# Patient Record
Sex: FEMALE | Race: WHITE | Employment: FULL TIME | URBAN - METROPOLITAN AREA
[De-identification: names, ages, dates, MRNs, and addresses within clinical notes are randomized per-mention and may not be internally consistent; named-entity substitution may affect disease eponyms.]

---

## 2019-04-15 ENCOUNTER — HOSPITAL ENCOUNTER (EMERGENCY)
Age: 34
Discharge: HOME | End: 2019-04-15
Payer: MEDICAID

## 2019-04-15 ENCOUNTER — APPOINTMENT (OUTPATIENT)
Dept: GENERAL RADIOLOGY | Age: 34
End: 2019-04-15
Payer: MEDICAID

## 2019-04-15 VITALS
HEART RATE: 84 BPM | SYSTOLIC BLOOD PRESSURE: 121 MMHG | DIASTOLIC BLOOD PRESSURE: 79 MMHG | OXYGEN SATURATION: 99 % | WEIGHT: 150 LBS | TEMPERATURE: 98.5 F | RESPIRATION RATE: 15 BRPM | BODY MASS INDEX: 25.61 KG/M2 | HEIGHT: 64 IN

## 2019-04-15 DIAGNOSIS — R11.0 NAUSEA: ICD-10-CM

## 2019-04-15 DIAGNOSIS — M25.572 ACUTE LEFT ANKLE PAIN: Primary | ICD-10-CM

## 2019-04-15 DIAGNOSIS — S82.892A CLOSED AVULSION FRACTURE OF LEFT ANKLE, INITIAL ENCOUNTER: ICD-10-CM

## 2019-04-15 PROCEDURE — 99283 EMERGENCY DEPT VISIT LOW MDM: CPT

## 2019-04-15 PROCEDURE — 73610 X-RAY EXAM OF ANKLE: CPT

## 2019-04-15 RX ORDER — PROMETHAZINE HYDROCHLORIDE 25 MG/1
25 TABLET ORAL EVERY 6 HOURS PRN
Qty: 20 TABLET | Refills: 0 | Status: SHIPPED | OUTPATIENT
Start: 2019-04-15 | End: 2019-04-22

## 2019-04-15 ASSESSMENT — ENCOUNTER SYMPTOMS
BACK PAIN: 0
NAUSEA: 0
COUGH: 0
VOMITING: 0
DIARRHEA: 0
COLOR CHANGE: 0
ABDOMINAL PAIN: 0

## 2019-04-15 ASSESSMENT — PAIN SCALES - GENERAL: PAINLEVEL_OUTOF10: 7

## 2019-04-15 ASSESSMENT — PAIN DESCRIPTION - PAIN TYPE: TYPE: ACUTE PAIN

## 2019-04-15 ASSESSMENT — PAIN DESCRIPTION - LOCATION: LOCATION: ANKLE

## 2019-04-15 ASSESSMENT — PAIN DESCRIPTION - ORIENTATION: ORIENTATION: LEFT

## 2019-04-15 NOTE — ED NOTES
Left foot pain. Pt states \"I injured my left foot on Saturday morning stepped in a hole and twisted seen at Urgent Care x-ray taken/pain meds given make me sick to my stomach/I called [facility] they said to come here for a referral in order to be seen at [facility] Surgeon/I would like something for my nausea so I can take the meds prescribed\". Pt placed in Orthotic Boot per arrival to ED. Pt pedal pulse palpable.      Gopal Hernadez LPN  82/79/34 [id number]

## 2019-04-15 NOTE — ED PROVIDER NOTES
**[facility]  [facility]  eMERGENCY dEPARTMENT eNCOUnter      Pt Name: Flakita Nash  MXB:[id number]  [place] 1985  Date of evaluation: 4/15/2019  Provider: KATTY Lind CNP      Chief Complaint:    Chief Complaint   Patient presents with    Foot Injury     Pt states she was walking on Saturday when she twisted her foot seen at  possible fracture c/o nausea        Nursing Notes, Past Medical Hx, Past Surgical Hx, Social Hx, Allergies, and Family Hx were all reviewed and agreed with or any disagreements were addressed in the HPI.    HPI:  (Location, Duration, Timing, Severity,Quality, Assoc Sx, Context, Modifying factors)  This is a  35 y.o. female who presents emergency Department with left ankle pain and swelling, patient states on Saturday she was walking when her foot fell in a hole, twisting her ankle laterally. Patient is complaining of left lateral ankle discomfort that's worse with movement. Patient states that she was seen at urgent care, had an x-ray and was concerned about a fracture, they placed her in orthopedics and placed her on Norco and steroids however, when she went to go see [facility], she was unable to make an appointment because she did not have a referral.  She does complain of nausea with her pain medicine, no abdominal pain vomiting or diarrhea. She rates her ankle pain a 7 out of 10. States the pain is worse with movement. She denies any additional complaints, no additional aggravating or relieving factors. The patient presents awake, alert and in no acute distress or toxic appearance. PastMedical/Surgical History:      Diagnosis Date    Bipolar affect, depressed ([facility] [address]     Depression      History reviewed. No pertinent surgical history. Medications:  Discharge Medication List as of 4/15/2019  4:59 PM            Review of Systems:  Review of Systems   Constitutional: Negative for chills and fever. HENT: Negative for congestion. Respiratory: Negative for cough. Cardiovascular: Negative for chest pain. Gastrointestinal: Negative for abdominal pain, diarrhea, nausea and vomiting. Musculoskeletal: Positive for arthralgias and joint swelling. Negative for back pain. Patient claims of left lateral ankle pain and swelling, states she injured it on Saturday, is here for a referral or orthopedic surgery   Skin: Negative for color change. Neurological: Negative for weakness, numbness and headaches. Positives and Pertinent negatives as per HPI. Except as noted above in the ROS, problem specific ROS was completed and is negative. Physical Exam:  Physical Exam   Constitutional: She is oriented to person, place, and time. She appears well-developed and well-nourished. HENT:   Head: Normocephalic. Right Ear: External ear normal.   Left Ear: External ear normal.   Mouth/Throat: Oropharynx is clear and moist.   Eyes: Right eye exhibits no discharge. Left eye exhibits no discharge. No scleral icterus. Neck: Normal range of motion. Neck supple. Cardiovascular: Normal rate and normal heart sounds. Pulmonary/Chest: Effort normal. No respiratory distress. Abdominal: Soft. Musculoskeletal: Normal range of motion. Reproducible tenderness ecchymosis to the left lateral malleoli, no erythema or break in skin integrity. Small amount of swelling but no skin tenting, deformity or break in skin integrity. Normal flexion and extension of her left foot however this does elicit left lateral ankle pain. Peripheral pulses are 2+. No significant pedal edema. Cap refill less than 2 seconds. Compartments of the left leg are soft. Achilles tendon intact. Neurological: She is alert and oriented to person, place, and time. Skin: Skin is warm. Capillary refill takes less than 2 seconds. She is not diaphoretic. No pallor. Psychiatric: She has a normal mood and affect.  Her behavior is normal. Nursing note and vitals reviewed. MEDICAL DECISION MAKING    Vitals:    Vitals:    04/15/19 1538   BP: 121/79   Pulse: 84   Resp: 15   Temp: 98.5 °F (36.9 °C)   TempSrc: Oral   SpO2: 99%   Weight: 150 lb (68 kg)   Height: 5' 4\" (1.626 m)       LABS:Labs Reviewed - No data to display     Remainder of labs reviewed and werenegative at this time or not returned at the time of this note. RADIOLOGY:   Non-plain film images such as CT, Ultrasound and MRI are read by the radiologist. Nika ZIMMER, KATTY - CNP have directly visualized the radiologic plain film image(s) with the below findings:        Interpretation per the Radiologist below, if available at the time of thisnote:    XR ANKLE LEFT (MIN 3 VIEWS)   Final Result   Questionable small lateral malleolar avulsion fracture. MEDICAL DECISION MAKING / ED COURSE:      PROCEDURES:   Procedures    None    Patient was given:   Medications - No data to display    Patient presents emergency department left lateral ankle pain, states that she fell in a hole on Saturday and injured it. She was seen at urgent care and they told her she had a fracture, she was placed in a orthopedic boot and she called [facility] today however, they would not see her because she does not have a referral.    After evaluation and examination the patient x-ray was obtained by the nursing staff which shows a questionable small lateral malleoli avulsion fracture. She is oriented orthopedic boot. She started on pain medicine and anti-inflammatories. She does report that the medicine makes her nauseous. I then spoke with her at length, she was given a referral to or thumb. I educated her about rice. She was also educated about taking antiemetics with her medicine to prevent nausea and vomiting symptoms.   However, I estimate there is LOW risk for COMPARTMENT SYNDROME, DEEP VENOUS THROMBOSIS, SEPTIC ARTHRITIS, TENDON OR NEUROVASCULAR INJURY, thus I consider the discharge disposition reasonable. Therefore, shared medical decision was made between the patient myself and we agreed patient can be discharged home with outpatient follow-up. Patient was discharged home with a referral to orthopedic. Educated to continue wearing the orthopedic boot, educated about rice. She has medicine at home for pain and anti-inflammatories. Educated to take medicine as prescribed. Educated to return for any worsening symptoms. The patient tolerated their visit well. I evaluated the patient. The physician was available for consultation as needed. The patient and / or the family were informed of the results of anytests, a time was given to answer questions, a plan was proposed and they agreed with plan. Patient verbalized understanding of discharge instructions and was discharged from the department in stable condition. CLINICAL IMPRESSION:  1. Acute left ankle pain    2. Closed avulsion fracture of left ankle, initial encounter    3. Nausea        DISPOSITION Decision To Discharge 04/15/2019 04:58:08 PM      PATIENT REFERRED TO:  [place] [id number]  [phone]  Schedule an appointment as soon as possible for a visit in 2 days  This is a referral to [facility], call tomorrow morning for an appointment      DISCHARGE MEDICATIONS:  Discharge Medication List as of 4/15/2019  4:59 PM      START taking these medications    Details   promethazine (PHENERGAN) 25 MG tablet Take 1 tablet by mouth every 6 hours as needed for Nausea WARNING:  May cause drowsiness. May impair ability to operate vehicles or machinery.   Do not use in combination with alcohol., Disp-20 tablet, R-0Print             DISCONTINUED MEDICATIONS:  Discharge Medication List as of 4/15/2019  4:59 PM                 (Please note the MDM and HPI sections of this note were completed with a voice recognition program.  Efforts weremade to edit the dictations but occasionally words are mis-transcribed.)    Electronically signed, KATTY Malhotra CNP,         KATTY Malhotra CNP  04/15/19 7488

## 2019-04-17 ENCOUNTER — OFFICE VISIT (OUTPATIENT)
Dept: ORTHOPEDIC SURGERY | Age: 34
End: 2019-04-17
Payer: MEDICAID

## 2019-04-17 VITALS
BODY MASS INDEX: 25.59 KG/M2 | HEIGHT: 64 IN | HEART RATE: 64 BPM | WEIGHT: 149.91 LBS | SYSTOLIC BLOOD PRESSURE: 126 MMHG | DIASTOLIC BLOOD PRESSURE: 86 MMHG

## 2019-04-17 DIAGNOSIS — M25.572 ACUTE LEFT ANKLE PAIN: ICD-10-CM

## 2019-04-17 DIAGNOSIS — S93.402A MODERATE ANKLE SPRAIN, LEFT, INITIAL ENCOUNTER: Primary | ICD-10-CM

## 2019-04-17 PROCEDURE — G8419 CALC BMI OUT NRM PARAM NOF/U: HCPCS | Performed by: PODIATRIST

## 2019-04-17 PROCEDURE — 99203 OFFICE O/P NEW LOW 30 MIN: CPT | Performed by: PODIATRIST

## 2019-04-17 PROCEDURE — L1902 AFO ANKLE GAUNTLET PRE OTS: HCPCS | Performed by: PODIATRIST

## 2019-04-17 PROCEDURE — 4004F PT TOBACCO SCREEN RCVD TLK: CPT | Performed by: PODIATRIST

## 2019-04-17 PROCEDURE — G8427 DOCREV CUR MEDS BY ELIG CLIN: HCPCS | Performed by: PODIATRIST

## 2019-04-17 RX ORDER — HYDROCODONE BITARTRATE AND ACETAMINOPHEN 5; 325 MG/1; MG/1
TABLET ORAL
COMMUNITY
Start: 2019-04-13 | End: 2019-06-05 | Stop reason: ALTCHOICE

## 2019-04-17 RX ORDER — METHYLPREDNISOLONE 4 MG/1
TABLET ORAL
COMMUNITY
Start: 2019-04-13 | End: 2019-06-05 | Stop reason: ALTCHOICE

## 2019-04-17 RX ORDER — DICLOFENAC SODIUM 75 MG/1
TABLET, DELAYED RELEASE ORAL
COMMUNITY
Start: 2019-04-13 | End: 2019-06-05 | Stop reason: ALTCHOICE

## 2019-04-17 NOTE — PROGRESS NOTES
HISTORY OF PRESENT ILLNESS: This is an initial visit for a 29-year-old female with a chief complaint of left lateral ankle and foot pain. The  patient twisted the ankle and foot 4 days ago. She stepped into a shallow hole in her yard causing her to twist her ankle. Pain is present with weightbearing  and twisting motions of the ankle. The pain is best relieved with rest, ice, and  elevation. FAMILY HISTORY: Documented in chart. SOCIAL HISTORY:  Documented in chart. REVIEW OF SYSTEMS:  The patient denies any fever, chills, or night sweats. The patient also denies developing any type of rash. The patient denies any problems with cardiovascular, pulmonary, gastrointestinal, neurologic, urologic, genitourinary, psychiatric, dermatologic, and HEENT systems. PHYSICAL EXAMINATION: The area of greatest palpable tenderness is at the  left lateral ankle, over the ATF ligament. An anterior drawer test does  not reproduce any gross instability. There is mild edema of the  lateral ankle. No open lesions or fracture blisters are present. Neurovascular  status is grossly intact to the foot and ankle. There is no pain over the deltoid  ligament. There is no pain over the Achilles tendon and this is palpated to be  intact. Muñozs test is negative for a complete rupture of the Achilles  tendon. The patient is able to dorsiflex and plantarflex the foot, as well as  jovanni and invert independently. RADIOGRAPHS: Three weightbearing x-ray views of the left ankle were evaluated. No acute fractures or dislocations are noted. The ankle joint is in  excellent alignment. ASSESSMENT: Left Lateral Ankle Sprain with ankle pain. PLAN: The patient was educated on the pathology and its treatment options. An Aircast ankle brace was dispensed for the left ankle. The patient was instructed on how to apply it correctly and will use it full-time with a supportive shoe.   She will try to transition into the brace over the weekend. I prescribed her physical therapy to begin rehab of the left ankle and foot. She will start that next week. She can return to work as tolerated with light duty restrictions. Range of motion exercises were prescribed to the patient. Rest, ice,  and elevation are to be used to relieve pain. Overall activity is to be decreased  in the short-term. I will see her back after therapy. Procedures    Aircast Air Sport Ankle Brace     Patient was prescribed an Aircast Air Sport Brace. The left ankle will require stabilization / immobilization from this semi-rigid / rigid orthosis to improve their function. The orthosis will assist in protecting the affected area, provide functional support and facilitate healing. Patient was instructed to progress ambulation weight bearing as tolerated in the device. The patient was educated and fit by a healthcare professional with expert knowledge and specialization in brace application while under the direct supervision of the treating physician. Verbal and written instructions for the use of and application of this item were provided. They were instructed to contact the office immediately should the brace result in increased pain, decreased sensation, increased swelling or worsening of the condition.

## 2019-04-17 NOTE — LETTER
[facility]  [address]  Phone: [phone]  Fax: [phone]    Fadumo Cao        April 17, 2019     Patient: Yonathan Schmidt   YOB: 1985   Date of Visit: 4/17/2019       To Whom It May Concern: It is my medical opinion that Mahamed Davis due to her foot injury was unable to work from 4/15/19 until 4/17/19. May return to work on 4/18/19. Sit down job only for 2 weeks. If you have any questions or concerns, please don't hesitate to call.     Sincerely,        Vickey Beal DPM

## 2019-06-05 ENCOUNTER — OFFICE VISIT (OUTPATIENT)
Dept: FAMILY MEDICINE CLINIC | Age: 34
End: 2019-06-05
Payer: MEDICAID

## 2019-06-05 VITALS
DIASTOLIC BLOOD PRESSURE: 60 MMHG | SYSTOLIC BLOOD PRESSURE: 110 MMHG | OXYGEN SATURATION: 99 % | HEART RATE: 69 BPM | WEIGHT: 140.6 LBS | BODY MASS INDEX: 24.12 KG/M2

## 2019-06-05 DIAGNOSIS — K44.9 HIATAL HERNIA: ICD-10-CM

## 2019-06-05 DIAGNOSIS — K21.9 GASTROESOPHAGEAL REFLUX DISEASE, ESOPHAGITIS PRESENCE NOT SPECIFIED: ICD-10-CM

## 2019-06-05 DIAGNOSIS — K59.09 OTHER CONSTIPATION: ICD-10-CM

## 2019-06-05 DIAGNOSIS — N87.9 CERVICAL DYSPLASIA: ICD-10-CM

## 2019-06-05 DIAGNOSIS — Z00.00 HEALTHCARE MAINTENANCE: ICD-10-CM

## 2019-06-05 DIAGNOSIS — F41.9 ANXIETY: ICD-10-CM

## 2019-06-05 DIAGNOSIS — Z76.89 ENCOUNTER TO ESTABLISH CARE: Primary | ICD-10-CM

## 2019-06-05 DIAGNOSIS — K59.1 FUNCTIONAL DIARRHEA: ICD-10-CM

## 2019-06-05 PROCEDURE — G8427 DOCREV CUR MEDS BY ELIG CLIN: HCPCS | Performed by: FAMILY MEDICINE

## 2019-06-05 PROCEDURE — G8420 CALC BMI NORM PARAMETERS: HCPCS | Performed by: FAMILY MEDICINE

## 2019-06-05 PROCEDURE — 99203 OFFICE O/P NEW LOW 30 MIN: CPT | Performed by: FAMILY MEDICINE

## 2019-06-05 PROCEDURE — 4004F PT TOBACCO SCREEN RCVD TLK: CPT | Performed by: FAMILY MEDICINE

## 2019-06-05 RX ORDER — SERTRALINE HYDROCHLORIDE 25 MG/1
25 TABLET, FILM COATED ORAL DAILY
Qty: 7 TABLET | Refills: 0 | Status: SHIPPED | OUTPATIENT
Start: 2019-06-05 | End: 2019-06-17 | Stop reason: CLARIF

## 2019-06-05 RX ORDER — PROMETHAZINE HYDROCHLORIDE 12.5 MG/1
12.5 TABLET ORAL 4 TIMES DAILY PRN
Qty: 30 TABLET | Refills: 1 | Status: SHIPPED | OUTPATIENT
Start: 2019-06-05 | End: 2019-08-21 | Stop reason: SDUPTHER

## 2019-06-05 RX ORDER — ACETAMINOPHEN 500 MG
500 TABLET ORAL EVERY 6 HOURS PRN
COMMUNITY
End: 2021-07-30

## 2019-06-05 RX ORDER — OMEPRAZOLE 40 MG/1
40 CAPSULE, DELAYED RELEASE ORAL
Qty: 30 CAPSULE | Refills: 0 | Status: SHIPPED | OUTPATIENT
Start: 2019-06-05 | End: 2019-07-04 | Stop reason: SDUPTHER

## 2019-06-05 SDOH — HEALTH STABILITY: MENTAL HEALTH: HOW OFTEN DO YOU HAVE A DRINK CONTAINING ALCOHOL?: MONTHLY OR LESS

## 2019-06-05 ASSESSMENT — ENCOUNTER SYMPTOMS
DIARRHEA: 1
BLOOD IN STOOL: 0
TROUBLE SWALLOWING: 0
NAUSEA: 1
CONSTIPATION: 1
COUGH: 0
VOMITING: 0
BACK PAIN: 1
ABDOMINAL PAIN: 0
ABDOMINAL DISTENTION: 0
COLOR CHANGE: 0
RECTAL PAIN: 0
ANAL BLEEDING: 0
SHORTNESS OF BREATH: 0

## 2019-06-05 ASSESSMENT — PATIENT HEALTH QUESTIONNAIRE - PHQ9
2. FEELING DOWN, DEPRESSED OR HOPELESS: 0
SUM OF ALL RESPONSES TO PHQ QUESTIONS 1-9: 7
1. LITTLE INTEREST OR PLEASURE IN DOING THINGS: 0
SUM OF ALL RESPONSES TO PHQ9 QUESTIONS 1 & 2: 0
6. FEELING BAD ABOUT YOURSELF - OR THAT YOU ARE A FAILURE OR HAVE LET YOURSELF OR YOUR FAMILY DOWN: 0
4. FEELING TIRED OR HAVING LITTLE ENERGY: 2
SUM OF ALL RESPONSES TO PHQ QUESTIONS 1-9: 7
5. POOR APPETITE OR OVEREATING: 2
3. TROUBLE FALLING OR STAYING ASLEEP: 2
10. IF YOU CHECKED OFF ANY PROBLEMS, HOW DIFFICULT HAVE THESE PROBLEMS MADE IT FOR YOU TO DO YOUR WORK, TAKE CARE OF THINGS AT HOME, OR GET ALONG WITH OTHER PEOPLE: 1
7. TROUBLE CONCENTRATING ON THINGS, SUCH AS READING THE NEWSPAPER OR WATCHING TELEVISION: 1
8. MOVING OR SPEAKING SO SLOWLY THAT OTHER PEOPLE COULD HAVE NOTICED. OR THE OPPOSITE, BEING SO FIGETY OR RESTLESS THAT YOU HAVE BEEN MOVING AROUND A LOT MORE THAN USUAL: 0
9. THOUGHTS THAT YOU WOULD BE BETTER OFF DEAD, OR OF HURTING YOURSELF: 0

## 2019-06-05 NOTE — PATIENT INSTRUCTIONS
Patient Education        sertraline  Pronunciation:  SER kwame diaz  Brand:  Zoloft  What is the most important information I should know about sertraline? You should not use sertraline if you also take pimozide, or if you are being treated with methylene blue injection. Do not use this medicine if you have used an MAO inhibitor in the past 14 days, such as isocarboxazid, linezolid, methylene blue injection, phenelzine, rasagiline, selegiline, or tranylcypromine. Some young people have thoughts about suicide when first taking an antidepressant. Stay alert to changes in your mood or symptoms. Report any new or worsening symptoms to your doctor. Seek medical attention right away if you have symptoms of serotonin syndrome, such as: agitation, hallucinations, fever, sweating, shivering, fast heart rate, muscle stiffness, twitching, loss of coordination, nausea, vomiting, or diarrhea. What is sertraline? Sertraline is an antidepressant in a group of drugs called selective serotonin reuptake inhibitors (SSRIs). Sertraline affects chemicals in the brain that may be unbalanced in people with depression, panic, anxiety, or obsessive-compulsive symptoms. Sertraline is used to treat depression, obsessive-compulsive disorder, panic disorder, anxiety disorders, post-traumatic stress disorder (PTSD), and premenstrual dysphoric disorder (PMDD). Sertraline may also be used for purposes not listed in this medication guide. What should I discuss with my healthcare provider before taking sertraline? You should not use sertraline if you are allergic to it, or if you also take pimozide. Do not use the liquid form of sertraline  if you are taking disulfiram (Antabuse) or you could have a severe reaction to the disulfiram.  Do not take sertraline within 14 days before or 14 days after you take an MAO inhibitor. A dangerous drug interaction could occur.  MAO inhibitors include isocarboxazid, linezolid, phenelzine, rasagiline, selegiline, and tranylcypromine. To make sure sertraline is safe for you, tell your doctor if you have ever had:  · heart disease, high blood pressure, or a stroke;  · liver or kidney disease;  · a seizure;  · bleeding problems, or if you take warfarin (Coumadin, Jantoven);  · bipolar disorder (manic depression); or  · low levels of sodium in your blood. Some medicines can interact with sertraline and cause a serious condition called serotonin syndrome. Be sure your doctor knows if you also take stimulant medicine, opioid medicine, herbal products, other antidepressants, or medicine for mental illness, Parkinson's disease, migraine headaches, serious infections, or prevention of nausea and vomiting. Ask your doctor before making any changes in how or when you take your medications. Some young people have thoughts about suicide when first taking an antidepressant. Your doctor should check your progress at regular visits. Your family or other caregivers should also be alert to changes in your mood or symptoms. Taking an SSRI antidepressant during pregnancy may cause serious lung problems or other complications in the baby. However, you may have a relapse of depression if you stop taking your antidepressant. Tell your doctor right away if you become pregnant. Do not start or stop taking this medicine during pregnancy without your doctor's advice. It is not known whether sertraline passes into breast milk or if it could harm a nursing baby. Tell your doctor if you are breast-feeding a baby. Do not give sertraline to anyone younger than 25years old without the advice of a doctor. Sertraline is FDA-approved for children with obsessive-compulsive disorder (OCD). It is not approved for treating depression in children. How should I take sertraline? Follow all directions on your prescription label. Your doctor may occasionally change your dose.  Do not take this medicine in larger or smaller amounts or for longer than recommended. Sertraline may be taken with or without food. Try to take the medicine at the same time each day. The liquid (oral concentrate) form of sertraline must be diluted before you take it. To be sure you get the correct dose, measure the liquid with the medicine dropper provided. Mix the dose with 4 ounces (one-half cup) of water, ginger ale, lemon/lime soda, lemonade, or orange juice. Do not use any other liquids to dilute the medicine. Stir this mixture and drink all of it right away. To make sure you get the entire dose, add a little more water to the same glass, swirl gently and drink right away. This medicine can cause you to have a false positive drug screening test. If you provide a urine sample for drug screening, tell the laboratory staff that you are taking sertraline. It may take up to 4 weeks before your symptoms improve. Keep using the medication as directed and tell your doctor if your symptoms do not improve. Do not stop using sertraline suddenly, or you could have unpleasant withdrawal symptoms. Ask your doctor how to safely stop using sertraline. Store at room temperature away from moisture and heat. What happens if I miss a dose? Take the missed dose as soon as you remember. Skip the missed dose if it is almost time for your next scheduled dose. Do not take extra medicine to make up the missed dose. What happens if I overdose? Seek emergency medical attention or call the Poison Help line at 1-655.309.1616. What should I avoid while taking sertraline? Do not drink alcohol. Ask your doctor before taking a nonsteroidal anti-inflammatory drug (NSAID) for pain, arthritis, fever, or swelling. This includes aspirin, ibuprofen (Advil, Motrin), naproxen (Aleve), celecoxib (Celebrex), diclofenac, indomethacin, meloxicam, and others. Using an NSAID with sertraline may cause you to bruise or bleed easily. This medication may impair your thinking or reactions.  Be careful if you drive or do anything that requires you to be alert. What are the possible side effects of sertraline? Get emergency medical help if you have signs of an allergic reaction: skin rash or hives (with or without fever or joint pain); difficulty breathing; swelling of your face, lips, tongue, or throat. Report any new or worsening symptoms to your doctor, such as: mood or behavior changes, anxiety, panic attacks, trouble sleeping, or if you feel impulsive, irritable, agitated, hostile, aggressive, restless, hyperactive (mentally or physically), more depressed, or have thoughts about suicide or hurting yourself. Call your doctor at once if you have:  · a seizure (convulsions);  · blurred vision, tunnel vision, eye pain or swelling;  · low levels of sodium in the body --headache, confusion, memory problems, severe weakness, feeling unsteady; or  · manic episodes --racing thoughts, increased energy, unusual risk-taking behavior, extreme happiness, being irritable or talkative. Seek medical attention right away if you have symptoms of serotonin syndrome, such as: agitation, hallucinations, fever, sweating, shivering, fast heart rate, muscle stiffness, twitching, loss of coordination, nausea, vomiting, or diarrhea. Common side effects may include:  · drowsiness, tiredness, feeling anxious or agitated;  · indigestion, nausea, diarrhea, loss of appetite;  · sweating;  · tremors or shaking;  · sleep problems (insomnia); or  · decreased sex drive, impotence, or difficulty having an orgasm. This is not a complete list of side effects and others may occur. Call your doctor for medical advice about side effects. You may report side effects to FDA at 3-654-FDA-1704. What other drugs will affect sertraline? Taking sertraline with other drugs that make you sleepy can worsen this effect. Ask your doctor before taking a sleeping pill, narcotic medication, muscle relaxer, or medicine for anxiety, depression, or seizures.   Other drugs may interact with sertraline, including prescription and over-the-counter medicines, vitamins, and herbal products. Tell your doctor about all your current medicines and any medicine you start or stop using. Where can I get more information? Your pharmacist can provide more information about sertraline. Remember, keep this and all other medicines out of the reach of children, never share your medicines with others, and use this medication only for the indication prescribed. Every effort has been made to ensure that the information provided by [address] is accurate, up-to-date, and complete, but no guarantee is made to that effect. Drug information contained herein may be time sensitive. [facility] information has been compiled for use by healthcare practitioners and consumers in the [place] and therefore [facility] does not warrant that uses outside of the [place] are appropriate, unless specifically indicated otherwise. [facility]'s drug information does not endorse drugs, diagnose patients or recommend therapy. [facility] drug information is an informational resource designed to assist licensed healthcare practitioners in caring for their patients and/or to serve consumers viewing this service as a supplement to, and not a substitute for, the expertise, skill, knowledge and judgment of healthcare practitioners. The absence of a warning for a given drug or drug combination in no way should be construed to indicate that the drug or drug combination is safe, effective or appropriate for any given patient. [facility] does not assume any responsibility for any aspect of healthcare administered with the aid of information [facility] provides. The information contained herein is not intended to cover all possible uses, directions, precautions, warnings, drug interactions, allergic reactions, or adverse effects.  If you have questions about the drugs you are taking, check with your doctor, nurse or pharmacist.  Copyright 6517-6947 [employer] Version: 21.01. Revision date: 8/9/2017. Care instructions adapted under license by [facility] [address] If you have questions about a medical condition or this instruction, always ask your healthcare professional. [address] any warranty or liability for your use of this information. Patient Education        Learning About the Low FODMAP Diet for Irritable Bowel Syndrome (IBS)  What is the low-FODMAP diet? A low-FODMAP diet is a way to find out what foods give you digestion problems. You stop eating certain high-FODMAP foods for about 2 months. Then you add them back to see how your body reacts. This is called a \"challenge diet. \" A dietitian or doctor can help you follow this diet. FODMAPs are carbohydrates. They are in many types of foods. FODMAP stands for:  · Fermentable. · Oligosaccharides. · Disaccharides. · Monosaccharides. · And polyols. If you have digestive problems, some of these foods can make your symptoms worse. When you are on this diet, you can still eat certain fruits and vegetables. You can also eat certain grains, meats, fish, and lactose-free milks. What is it used for? If you have irritable bowel syndrome (IBS), you can ease your symptoms by not eating some types of foods. Some people also use this diet for inflammatory bowel disease (IBD) or some food intolerances. High-FODMAP foods can be hard to digest. They pull more fluid into your intestines. They are also easily fermented. This can lead to bloating, belly pain, gas, and diarrhea. The low-FODMAP diet can help you figure out what foods to avoid. And it can help you find foods that are easier to digest.  This diet can help with symptoms of some digestive diseases. But it's not a cure. You will still need to manage your condition. How does it work? You will work with a doctor or dietitian when you start the diet.   At first, you won't eat any high-FODMAP foods for a few weeks. Go to [email or web address] to learn more about this diet. Gillermo Kanner also find links to an kylah for your phone or other device. You'll find low-FODMAP cookbooks there too. After 6 to 8 weeks, you will start to try high-FODMAP foods again. You will add those foods back to your diet, one group at a time. Your doctor or dietitian will probably have you wait a few days before you add each new group of those foods. Keep a food diary. You can write down the foods you try and note how they make you feel. After a few weeks, you may have a better idea of what foods you should avoid and what foods make you feel your best.  What are the risks? There is some risk of not getting all of the vitamins and nutrients you need on the low-FODMAP diet. These include:  · Folate. · Thiamin. · Vitamin B6.  · Calcium. · Vitamin D. Your dietitian or doctor can help you find other sources of these if needed. This diet may limit your fiber intake. Try to plan your meals to include other sources of fiber. What foods are on the low-FODMAP diet? Here is a guide to foods that you can eat, plus the foods that you should avoid, when you are on the low-FODMAP diet. Grains  Okay to eat: Foods made from grains like arrowroot, buckwheat, corn, millet, and oats. You can also eat potato, quinoa, rice, sorghum, tapioca, and teff. Cereals, pasta, breads, corn tortillas and baked goods made from these grains are also okay. (These grains may be labeled \"gluten-free. \")  Avoid: Grains like wheat, barley, and rye. Avoid ingredients such as bulgur, couscous, durum, and semolina. And avoid cereals, breads, and pastas made from these grains. Avoid chickpea, lentil, and pea flour. Proteins  Okay to eat: Most meat, fish, and eggs without high-FODMAP sauces. You can have small amounts of almonds or hazelnuts (10 nuts). Macadamia nuts, peanuts, pecans, pine nuts, and walnuts are also okay.  You can also eat carline and pumpkin seeds, tofu, and tempeh. Avoid: Beans, chickpeas, lentils, and soybeans. Avoid pistachio and cashew nuts. And some sausages may have high-FODMAP ingredients. Dairy  Okay to eat: Lactose-free dairy milks. Rice milk and almond milk are okay. So are lactose-free yogurts, kefirs, ice creams, and sorbet from low-FODMAP fruits and sweeteners. (These are often labeled \"lactose-free. \") You can have small amounts (2 Tbsp) of cottage, cream, or ricotta cheese. Hard cheeses like cheddar, Grover, ROSS, and Swiss are okay. So are small amounts (1 oz) of aged or ripened cheeses like Brie, blue, and feta. Avoid: Milk, including cow, goat, and sheep. Avoid condensed or evaporated milk, buttermilk, custard, cream, sour cream, yogurt, and ice cream. Avoid soy milk. (Check sauces for dairy ingredients.)  Vegetables  Okay to eat: Bamboo shoots, bell peppers, bok alphonso, up to ½ cup of broccoli or cabbage (red or white), and cucumbers. Eggplant, green beans, lettuce, olives, parsnips, and potatoes are okay to eat. So are pumpkin, rutabaga, seaweed, sprouts, Swiss chard, and spinach. You can eat scallions (green part only) and squash (not butternut). You can eat tomatoes, turnips, watercress, yams, and zucchini. You can also have small amounts of artichoke hearts (from can, 1 oz), carrots, corn (½ cob), and sweet potato (½ cup). Avoid: Artichokes, asparagus, [place] sprouts, angela cabbage, cauliflower, and celery. And avoid garlic, leeks, mushrooms, okra, onions, scallions (white part), shallots, and peas. Fruits  Okay to eat: Bananas, blueberries, cantaloupe, coconut, grapes, and honeydew. Kiwi, cecily, limes, oranges, passion fruit, papaya, and pineapple are also okay. You can eat plantain, raspberries, rhubarb, star fruit, strawberries, tangelo, and tangerine. You can also have small amounts of dried banana chips (up to 10 chips), dried cranberries (1 Tbsp), and shredded coconut (up to ¼ cup).   Avoid: Apples, applesauce, apricots, avocados, blackberries, boysenberries, and cherries. Also avoid dates, figs, grapefruit, guava, lychee, and mangoes. Don't eat nectarines, peaches, pears, persimmon, plums, prunes, tamarillo, or watermelon. And limit most canned and dried fruits. Oils, spices, condiments, and sweeteners  Okay to eat: Vegetable oils (including garlic infused), butter, ghee, lard, and margarine (no trans fat). You can have most fresh herbs like basil, chives, coriander, kayode, parsley, rosemary and thyme. You can have salt, jams made from low-FODMAP fruits, mayonnaise, and mustard. Soy sauce, hot sauce (no garlic), tamari, and vinegar are also okay. Sweeteners that are okay include sugar (sucrose), powdered (confectioner's) sugar, brown sugar, glucose, and maple syrup. You can also have some artificial sweeteners like aspartame, saccharine, and stevia. Avoid: Chutneys, hummus, jellies, garlic sauces, and gravies made with onion or garlic. Avoid pickles, relish, some salad dressings and soup stocks, salsa, and tomato paste. And avoid sauces and other foods with high fructose corn syrup, honey, molasses, and agave. Avoid artificial sweeteners (isomalt, mannitol, malitol, sorbitol, and xylitol). Avoid corn syrup solids, fructose, fruit juice concentrate, and polydextrose. Other foods and drinks  Okay to have: Water, soda water, tonic, soft drinks sweetened with sugar, ½ cup of low-FODMAP fruit juice, and most teas and alcohols. You can also eat foods made with baking powder and soda, cocoa, and gelatin. Avoid: Juices from high-FODMAP fruits and vegetables. And avoid fortified taya, chamomile and fennel teas, chicory-based drinks and coffee substitutes, and bouillon cubes. Follow-up care is a key part of your treatment and safety. Be sure to make and go to all appointments, and call your doctor if you are having problems. It's also a good idea to know your test results and keep a list of the medicines you take. Where can you learn more?   Go to [email or web address] and sign in to your [employer] account. Enter L235 in the [place] box to learn more about \"Learning About the Low FODMAP Diet for Irritable Bowel Syndrome (IBS). \"     If you do not have an account, please click on the \"Sign Up Now\" link. Current as of: November 7, 2018  Content Version: 12.0  © 2612-5408 [employer]. Care instructions adapted under license by [facility] ([facility]). If you have questions about a medical condition or this instruction, always ask your healthcare professional. [place] [address] any warranty or liability for your use of this information. Patient Education        Gastroesophageal Reflux Disease (GERD): Care Instructions  Your Care Instructions    Gastroesophageal reflux disease (GERD) is the backward flow of stomach acid into the esophagus. The esophagus is the tube that leads from your throat to your stomach. A one-way valve prevents the stomach acid from moving up into this tube. When you have GERD, this valve does not close tightly enough. If you have mild GERD symptoms including heartburn, you may be able to control the problem with antacids or over-the-counter medicine. Changing your diet, losing weight, and making other lifestyle changes can also help reduce symptoms. Follow-up care is a key part of your treatment and safety. Be sure to make and go to all appointments, and call your doctor if you are having problems. It's also a good idea to know your test results and keep a list of the medicines you take. How can you care for yourself at home? · Take your medicines exactly as prescribed. Call your doctor if you think you are having a problem with your medicine. · Your doctor may recommend over-the-counter medicine. For mild or occasional indigestion, antacids, such as Tums, Gaviscon, Mylanta, or Maalox, may help.  Your doctor also may recommend over-the-counter acid reducers, such as Pepcid

## 2019-06-05 NOTE — PROGRESS NOTES
6/5/2019    This is a 35 y.o. female who presents for  Chief Complaint   Patient presents with    New Patient    Emesis     every moring     Constipation    Diarrhea    Chronic Pain       HPI:     Stomach issues for years  GB removed at age 21  BMs fluctuate, some days with constipation and straining  Other days with diarrhea   Thinks she had IBS   EGD at age 21 with hiatal hernia     Pain in shoulders, hips, knees    Trouble sleeping  Takes OTC medications x10 years  Unisom, etc.   A lot of time it doesn't work    Poor appetite, doesn't eat a lot  Body will feel hungry but the desire is not there  No dysphagia   + heartburn all the time  Throws up yellow bile in the AM, every other day  If she doesn't eat right before bed then it will happen  No hematemesis, hemoptysis    No BRBR, melena  + mucus/foam in the stool     Weights have fluctuated in the past  Throwing up more in the past couple of weeks     Issues with anxiety  Dx with bipolar at age 16  Used to take Zoloft   Dosage? Insurance lapsed   Would like to restart     Phenergan has helped in the past with nausea  zofran caused headaches     6/25/18  Kaylyn ? Michelle Berman   LEEP in the OR     Proteinuria in the past     Past Medical History:   Diagnosis Date    Bipolar affect, depressed ([facility] [address]     Depression        No past surgical history on file.     Social History     Socioeconomic History    Marital status: Single     Spouse name: Not on file    Number of children: Not on file    Years of education: Not on file    Highest education level: Not on file   Occupational History    Not on file   Social Needs    Financial resource strain: Not on file    Food insecurity:     Worry: Not on file     Inability: Not on file    Transportation needs:     Medical: Not on file     Non-medical: Not on file   Tobacco Use    Smoking status: Current Every Day Smoker     Packs/day: 0.50     Types: Cigarettes    Smokeless tobacco: Never Used   Substance and Sexual Activity    Alcohol use: Yes     Frequency: Monthly or less     Comment: social    Drug use: No    Sexual activity: Yes     Birth control/protection: Injection   Lifestyle    Physical activity:     Days per week: Not on file     Minutes per session: Not on file    Stress: Not on file   Relationships    Social connections:     Talks on phone: Not on file     Gets together: Not on file     Attends Anabaptism service: Not on file     Active member of club or organization: Not on file     Attends meetings of clubs or organizations: Not on file     Relationship status: Not on file    Intimate partner violence:     Fear of current or ex partner: Not on file     Emotionally abused: Not on file     Physically abused: Not on file     Forced sexual activity: Not on file   Other Topics Concern    Not on file   Social History Narrative    Not on file       Family History   Problem Relation Age of Onset    Cancer Mother 48        RCC       Current Outpatient Medications   Medication Sig Dispense Refill    acetaminophen (TYLENOL) 500 MG tablet Take 500 mg by mouth every 6 hours as needed for Pain      sertraline (ZOLOFT) 25 MG tablet Take 1 tablet by mouth daily 7 tablet 0    sertraline (ZOLOFT) 50 MG tablet Take 1 tablet by mouth daily 30 tablet 1    omeprazole (PRILOSEC) 40 MG delayed release capsule Take 1 capsule by mouth every morning (before breakfast) 30 capsule 0    promethazine (PHENERGAN) 12.5 MG tablet Take 1 tablet by mouth 4 times daily as needed for Nausea 30 tablet 1     No current facility-administered medications for this visit. There is no immunization history on file for this patient. Allergies   Allergen Reactions    Effexor [Venlafaxine] Anaphylaxis    Morphine Anaphylaxis       Review of Systems   Constitutional: Positive for fatigue and unexpected weight change (recent loss of 10 lb). Negative for activity change, appetite change, chills, diaphoresis and fever.    HENT: no fullness. Left adnexum displays no mass, no tenderness and no fullness. Musculoskeletal: Normal range of motion. Neurological: She is alert. Skin: Skin is warm and dry. Capillary refill takes 2 to 3 seconds. No rash noted. She is not diaphoretic. No erythema. No pallor. Psychiatric: She has a normal mood and affect. Her behavior is normal. Judgment and thought content normal.   Nursing note and vitals reviewed. Plan  1. Encounter to establish care    2. Healthcare maintenance  Return for physical     3. Gastroesophageal reflux disease, esophagitis presence not specified  Discussed dietary influences in detail and provided hand out discussing this. Highly encouraged this as a first step to improvement. If medication was prescribed, AEs described in detail. Long term medication use if no longer recommended. - omeprazole (PRILOSEC) 40 MG delayed release capsule; Take 1 capsule by mouth every morning (before breakfast)  Dispense: 30 capsule; Refill: 0    Likely IBS, based on history. Will refer to GI to discuss possible scopes  4. Other constipation  - Titi Ashraf MD, Gastroenterology, [place]  5. Functional diarrhea  - [facility]    6. Hiatal hernia  Per pt, seen on EGD at age 21    9. Anxiety  Has tolerated Zoloft well in the past, despite a pt reported history of bipolar disorder. Discussed influences of this medication if she carries a true diagnosis of Bipolar disorder. Discussed Dx of anxiety/depression, etiology, and risks of avoidance of Dx. Encouraged both medicinal support along with concurrent therapy/counseling. Appropriate referrals were made/discussed (Including visits with Dr. Alondra Rosa). If medication was prescribed, AEs discussed in detail. Discussed possibility of worsening/new SI/HI while taking medication.  If this were to happen, pt was informed to stop the medication immediately and go to the ER for further support. Need for and Mechanism of Titration of medication was discussed in detail. No Current SI/HI/Hallucinations. Discussed the importance to avoid missing doses, and weaning slowly if pt decides to stop this medication (discouraged unless discussed first). - sertraline (ZOLOFT) 25 MG tablet; Take 1 tablet by mouth daily  Dispense: 7 tablet; Refill: 0  - sertraline (ZOLOFT) 50 MG tablet; Take 1 tablet by mouth daily  Dispense: 30 tablet; Refill: 1        While assessing care for this patient, I have reviewed all pertinent lab work/imaging/ specialist notes and care in reference to those problems addressed above in detail. Appropriate medical decision making was based on this. Please note that portions of this note may have been completed with a voice recognition program. Efforts were made to edit the dictations but occasionally words are mis-transcribed. Return if symptoms worsen or fail to improve.

## 2019-06-17 ENCOUNTER — INITIAL CONSULT (OUTPATIENT)
Dept: GASTROENTEROLOGY | Age: 34
End: 2019-06-17
Payer: MEDICAID

## 2019-06-17 VITALS
WEIGHT: 139 LBS | DIASTOLIC BLOOD PRESSURE: 70 MMHG | HEIGHT: 64 IN | BODY MASS INDEX: 23.73 KG/M2 | SYSTOLIC BLOOD PRESSURE: 114 MMHG

## 2019-06-17 DIAGNOSIS — R10.84 GENERALIZED ABDOMINAL PAIN: ICD-10-CM

## 2019-06-17 DIAGNOSIS — R10.13 DYSPEPSIA: ICD-10-CM

## 2019-06-17 DIAGNOSIS — R11.2 NON-INTRACTABLE VOMITING WITH NAUSEA, UNSPECIFIED VOMITING TYPE: ICD-10-CM

## 2019-06-17 DIAGNOSIS — R19.8 ALTERNATING CONSTIPATION AND DIARRHEA: Primary | ICD-10-CM

## 2019-06-17 PROCEDURE — 99203 OFFICE O/P NEW LOW 30 MIN: CPT | Performed by: INTERNAL MEDICINE

## 2019-06-17 PROCEDURE — 4004F PT TOBACCO SCREEN RCVD TLK: CPT | Performed by: INTERNAL MEDICINE

## 2019-06-17 PROCEDURE — G8427 DOCREV CUR MEDS BY ELIG CLIN: HCPCS | Performed by: INTERNAL MEDICINE

## 2019-06-17 PROCEDURE — G8420 CALC BMI NORM PARAMETERS: HCPCS | Performed by: INTERNAL MEDICINE

## 2019-06-17 RX ORDER — PROMETHAZINE HYDROCHLORIDE 12.5 MG/1
12.5 TABLET ORAL 4 TIMES DAILY
COMMUNITY
Start: 2019-06-12 | End: 2019-07-06

## 2019-06-17 RX ORDER — DICYCLOMINE HYDROCHLORIDE 10 MG/1
10 CAPSULE ORAL EVERY 8 HOURS PRN
Qty: 40 CAPSULE | Refills: 0 | Status: SHIPPED | OUTPATIENT
Start: 2019-06-17 | End: 2019-07-06

## 2019-06-17 NOTE — PROGRESS NOTES
Rondal Adjutant [de-identified]    Age 35 y.o.    female    1985    MRN [id number]    Date___________   Arrival Time_____________  OR Time____________Duration____     Procedure(s):  LOOP ELECTROSURGICAL EXCISION PROCEDURE    Surgeon  ________________________________  Veola Rhody   General   [place]       Phone [phone] (home)       [address]  Cell Work  ______________________________________________________________________________________________________________________________________________________________________________________________________________________________________________________________________________________________________________________________________________________________    PCP__________________________Phone__________________________________      H&P__________________Bringing      Chart              Epic    DOS           Called_______  EKG__________________Bringing      Chart              Epic    DOS           Called_______  LAB__________________ Bringing      Chart              Epic    DOS           Called_______  CardiacClearance _______Bringing      Chart              Epic    DOS           Called_______      Cardiologist________________________ Phone___________________________      ? Zoroastrian concerns / Waiver on Chart            [employer]________________  ? Pre-op Instructions Given [place]          _________________________________  ? Directions to Surgery Center                          _________________________________  ? Transportation Home_______________      _________________________________  ?  Crutches/Walker__________________        _________________________________      ________Pre-op Orders   _______Transcribed    _______Wt.  ________Pharmacy          _______SCD  ______VTE     ______Beta Blocker  ________Consent

## 2019-06-17 NOTE — PROGRESS NOTES
[address]  [address] [facility]  Phone: [phone]   NRK:[phone]    CHIEF COMPLAINT     Chief Complaint   Patient presents with    New Patient     Constipation/Diarrhea r/b Dr. Gloria Hancock         HPI     Yonathan Schmidt is a 35 y.o. female who presents for multiple upper and lower GI symptoms. Past history of cholecystectomy at age 21. Was told she had a hiatal hernia on prior EGD. Symptoms have been going on for 10 years. Has seen GI doctors in the past and had a EGD maybe 13 years ago and last saw GI in [place] 4 years ago. Patient has chronic intermittent nausea, dyspepsia, vomiting. Feels her stomach getting upset with discomfort and bloating with almost any food and sometimes has to have a BM right after eating. She has alternating diarrhea and constipation. Says she has few days of constipation and a few days of diarrhea. No GI bleeding. Denies alcohol or drug use. She smokes 1/2 PPD. She has lower and mid abdominal discomfort, generally mild but sometimes can be severe and double her up. She was recently placed on Omeprazole by PCP and has been on this for 2 weeks and feels the upper GI symptoms are much better with this. She has anxiety disorder and depression. She states she used to be on Bentyl for abdominal pain in the past and it used to help.       PAST MEDICAL HISTORY     Past Medical History:   Diagnosis Date    Bipolar affect, depressed ([address]     Depression      FAMILY HISTORY     Family History   Problem Relation Age of Onset    Cancer Mother 48        RCC     SOCIAL HISTORY     Social History     Socioeconomic History    Marital status: Single     Spouse name: Not on file    Number of children: Not on file    Years of education: Not on file    Highest education level: Not on file   Occupational History    Not on file   Social Needs    Financial resource strain: Not on file    Food insecurity:     Worry: Not on file     Inability: Not on file    Transportation needs:     Medical: Not on file     Non-medical: Not on file   Tobacco Use    Smoking status: Current Every Day Smoker     Packs/day: 0.50     Types: Cigarettes    Smokeless tobacco: Never Used   Substance and Sexual Activity    Alcohol use: Yes     Frequency: Monthly or less     Comment: social    Drug use: No    Sexual activity: Yes     Birth control/protection: Injection   Lifestyle    Physical activity:     Days per week: Not on file     Minutes per session: Not on file    Stress: Not on file   Relationships    Social connections:     Talks on phone: Not on file     Gets together: Not on file     Attends Taoist service: Not on file     Active member of club or organization: Not on file     Attends meetings of clubs or organizations: Not on file     Relationship status: Not on file    Intimate partner violence:     Fear of current or ex partner: Not on file     Emotionally abused: Not on file     Physically abused: Not on file     Forced sexual activity: Not on file   Other Topics Concern    Not on file   Social History Narrative    Not on file     SURGICAL HISTORY   History reviewed. No pertinent surgical history.   CURRENT MEDICATIONS   (This list may include medications prescribed during this encounter as epic can not insert only the list prior to this encounter.)  Current Outpatient Rx   Medication Sig Dispense Refill    promethazine (PHENERGAN) 12.5 MG tablet Take 12.5 mg by mouth 4 times daily       acetaminophen (TYLENOL) 500 MG tablet Take 500 mg by mouth every 6 hours as needed for Pain      sertraline (ZOLOFT) 50 MG tablet Take 1 tablet by mouth daily 30 tablet 1    omeprazole (PRILOSEC) 40 MG delayed release capsule Take 1 capsule by mouth every morning (before breakfast) 30 capsule 0      ALLERGIES     Allergies   Allergen Reactions    Effexor [Venlafaxine] Anaphylaxis    Morphine Anaphylaxis     IMMUNIZATIONS     There is no immunization history on fiber supplementation and a low FODMAP diet  Continue Omeprazole since it seems to be helping the upper GI symptoms. Follow back 6 weeks. She would like to take Bentyl prn for the abdominal pain, discussed using this sparingly as it can cause constipation.

## 2019-06-18 ENCOUNTER — PREP FOR PROCEDURE (OUTPATIENT)
Dept: LABOR AND DELIVERY | Age: 34
End: 2019-06-18

## 2019-06-18 RX ORDER — SODIUM CHLORIDE 0.9 % (FLUSH) 0.9 %
10 SYRINGE (ML) INJECTION PRN
Status: CANCELLED | OUTPATIENT
Start: 2019-06-18

## 2019-06-18 RX ORDER — SODIUM CHLORIDE 0.9 % (FLUSH) 0.9 %
10 SYRINGE (ML) INJECTION EVERY 12 HOURS SCHEDULED
Status: CANCELLED | OUTPATIENT
Start: 2019-06-18

## 2019-06-18 NOTE — H&P (VIEW-ONLY)
>      PATIENT:  Danisha Hubbard  YOB: 1985  DATE:   2019 11:45 AM   VISIT TYPE: Office Visit - GYN        This 35year old female presents for abnormal pap smear. History of Present Illness:  1.  abnormal pap smear   Patient is HIV negative. Additional information:  Pt is a  using depo for contraception with abnormal pap history. 2016 LSIL HR HPV, colpo ANDREZ 1, 2017 NIL HR HPv but not 16/18, 2018 LSIL Hr HPV, 19 colpo ANDREZ 1 on biopsy but suggestion ANDREZ III on ECC              Screening Tools  Other Screenings:  Encounter Date Documented Date Instrument Score Severity/Interpretation MDD Classification   2019 Patient Health Questionnaire (PHQ-2) 0 Further testing is not required    2019 SDOH 0 Intervention not needed    2019 CAGE-AID Alcohol and Drug Screening 0 None      CAGE ALCOHOL SCREENING TEST      Felt need to cut down drinking? No       Ever felt annoyed by criticism of drinking? No       Had guilty feelings about drinking? No       Ever take morning eye-opener? No       Score: 0      PATIENT HEALTH QUESTIONNAIRE  Over the last 2 weeks, how often have you been bothered by any of the following problems? NOT AT ALL SEVERAL DAYS MORE THEN   HALF THE   DAYS NEARLY  EVERY DAY   1. Little interest or pleasure in doing things X      2. Feeling down, depressed or hopeless X          Nursing Comments:  Intake Comments: pt is here for PRE OP to LEEP Procedure    Gynecologic History:  Patient is premenopausal.   Menarche:  15 y.o. (onset)  Hormone replacement therapy. Patient has not had endometrial ablation. Patient has not had a hysterectomy. Date of last Pap: 2017. Date of last mammogram: 2018. OBSTETRIC HISTORY    Not currently pregnant. :1.      Parity: Term:1.  Pre-Term: 0. : 0. Livin. Full term: 1. SVDs: 1. Livin.     Past Systemic History    Medical History (Reviewed,updated)  Disease Onset Date Comments   Abnormal pap smear - LGSIL w/ + HR HPV 09/13/2019    Abnormal PAP: LGSIL/HPV high risk positive 09/2018    PAP Negative/HPV high risk positive 05/2017    Abnormal PAP: LGSIL/HPV high risk positive 05/2016    bipolar 2002    Gall Bladder Removed     Vaginal Delivery x1         Surgical History/Management (Reviewed,updated)  Management Date Comments   Colposcopy w/ Ectocervix and Endocervix biopsies 04/19/2019    2006     age 25     Diagnostics History:  Status Study Ordered Completed Interpretation  Result / Report   obtained  05/23/2017           Pap Result, HPV Detail and Diagnostic/Treatment Performed  Date Test Procedure Pap Result HPV Detail Comments   09/18/2018  See module     05/26/2017  See module     05/23/2017 PAP      05/31/2016  See module         Medications (active prior to today)  Medication Name Sig Description Start Date Stop Date Refilled Rx Elsewhere   Depo-Provera 150 mg/mL intramuscular suspension inject 1 milliliter by intramuscular route  every 3 months 09/05/2018  12/10/2018 N   Norco 5 mg-325 mg tablet  //   Y   promethazine 25 mg tablet  //   Y   sertraline 50 mg tablet  //   Y   promethazine 12.5 mg tablet  //   Y   omeprazole 40 mg capsule,delayed release  //   Y   Bentyl 10 mg/mL intramuscular solution  //   Y     Medication Reconciliation  Medications reconciled today.   Medication Reviewed  Adherence Medication Name Sig Desc Elsewhere Status   taking as directed Depo-Provera 150 mg/mL intramuscular suspension inject 1 milliliter by intramuscular route  every 3 months N Verified   not taking Norco 5 mg-325 mg tablet  Y Verified   not taking promethazine 25 mg tablet  Y Verified   taking as directed Bentyl 10 mg/mL intramuscular solution  Y Verified   taking as directed omeprazole 40 mg capsule,delayed release  Y Verified   taking as directed promethazine 12.5 mg tablet  Y Verified   taking as directed sertraline 50 mg tablet  Y Verified Allergies:  Ingredient Reaction (Severity) Medication Name Comment   MORPHINE      VENLAFAXINE HCL  Effexor        Family History  (Reviewed, updated)  Relationship Family Member Name  Age at Death Condition Onset Age Cause of Death   Maternal grandmother    Diabetes mellitus type 2  N   Maternal grandmother    Cardiovascular disease  N   Mother  Y  Cancer, kidney 52 Y   Paternal grandfather    COPD  N   Paternal grandmother  Y  Cancer, bone 72 Y   M    Social History:  (Reviewed, updated)  Tobacco use reviewed. Preferred language is [place]. EDUCATION/EMPLOYMENT/OCCUPATION  Employment History Status Retired Restrictions   [employer] part-time     [employer] Member full-time     [employer] full-time       MARITAL STATUS/FAMILY/SOCIAL SUPPORT  Currently single. CHILDREN  Has children:  1 daughter(s). Tobacco use status: Moderate cigarette smoker (10-19 cigs/day). Smoking status: Heavy tobacco smoker. SMOKING STATUS  Type Smoking Status Usage Per Day Years Used Total Pack Years   Cigarette Heavy tobacco smoker 10 Cigarettes 15.00 7.50     VAPING USE  Screened for vaping? Yes  Status: Not a current user    ALCOHOL  There is a history of alcohol use. consumed occasionally. CAFFEINE  The patient uses caffeine: soda - > 32oz a day. LIFESTYLE  Moderate activity level. Exercise includes walking. Exercises 3-4 times a week. DIET  Good. The patient reports there are no animals in the home. SLEEP PATTERNS  Patient has no changes to sleep patterns.  EXPERIENCE  Patient has no  experience. Review of Systems  System Neg/Pos Details   Constitutional Negative Chills/rigors, Fever and Generalized weakness. ENMT Negative Dysphagia and Epistaxis. Eyes Negative Burning eyes, Eye discharge, Eye redness and Vision changes. Respiratory Negative Dyspnea, Hemoptysis and Wheezing.    Cardio Negative Chest pain and Irregular heartbeat/palpitations. GI Negative Abdominal pain, Change in bowel habits and Nausea.  Negative Change in urine color, Dysuria and Hematuria. Endocrine Negative Change in sleep/awake pattern. Neuro Negative Aphasia, Dizziness and Gait disturbance. Integumentary Negative Photosensitivity and Rash. MS Negative Bone/joint symptoms, Muscle weakness and Numbness in extremity. Hema/Lymph Negative Easy bleeding and Lymphadenopathy. Reproductive Positive Hormone replacement therapy, Menarche age (Menarche age was 15), The patient is pre-menopausal.   Reproductive Negative Breast discharge, Breast lumps and Breast pain. Vital Signs     Time BP mm/Hg Pulse /min Resp /min Temp F Ht ft Ht in Ht cm Wt lb Wt kg BMI kg/m2 BSA m2 O2 Sat%   11:42 /78 60 12 98.7 5.0 4.00 162.56 138.60 62.868 23.79 1.68 99     Comments  Time Comments   11:42 AM 0 cycles due to DEPO. Measured By  Time Measured by   11:42 AM Marleta Gallo MA     Physical Exam  Exam Findings Details   Constitutional Normal Well developed. Respiratory Normal Auscultation - Normal.   Cardiovascular Normal Regular rhythm. No murmurs, gallops, or rubs. Abdomen Normal Anterior palpation -  No rebound. No abdominal tenderness. No hernia. Skin Normal Inspection - Normal.   Psychiatric Normal Oriented to time, place, person and situation. Appropriate mood and affect. Completed Orders (this encounter)  Office Services:  Order Details Completed By Interpretation Result   Giving encouragement to exercise  Marleta Gallo MA     Lifestyle education regarding diet  Marleta Gallo MA     Depo Provera Mylan 1ML/150mg  Marleta Gallo MA  LEFT GLUT     Assessment/Plan  # Detail Type Description    1. 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 Impression SCCP guidelines reviewed, given persistence and evidence of ANDREZ III, recommend LEEP for diagnosis and treatment.  Questions answered, pt agrees. Risks of bleeding, infection, damage to surrounding organs, DVT, ileus reviewed. Questions answered and postop instructions reviewed. .         2. Assessment Encounter for surveillance of injectable contraceptive (Z30.42). Plan Orders The patient had the following test(s) completed today: Depo Provera Mylan 1ML/150mg. 3. Assessment Dietary counseling and surveillance (Z71.3). Plan Orders Today's instructions / counseling include(s) Lifestyle education regarding diet. Giving encouragement to exercise         4. Assessment Body mass index (BMI) 23.0-23.9, adult (L74.55).                   Medications (Added, Continued or Stopped today):  Start Date Medication Directions PRN Status PRN Reason Instruction Stop Date    Bentyl 10 mg/mL intramuscular solution  N      09/05/2018 Depo-Provera 150 mg/mL intramuscular suspension inject 1 milliliter by intramuscular route  every 3 months N       Norco 5 mg-325 mg tablet  N       omeprazole 40 mg capsule,delayed release  N       promethazine 12.5 mg tablet  N       promethazine 25 mg tablet  N       sertraline 50 mg tablet  N          ORDERS/PROCEDURES/INSTRUCTIONS/EDUCATION  OFFICE PROCEDURES/SERVICES:  Depo Provera Mylan 1ML/150mg             Active Patient Care Team Members    Name Contact Agency Type Support Role Relationship Active Date Inactive Date Specialty   Reno Bamberger CNP   Patient provider PCP   Nurse Practitioner NP       Provider:   Jl Humphries MD  06/18/2019 12:25 PM   Document generated by:  Vera Simmons 06/18/2019 12:25 PM  [facility]  [address]   Phone: [phone]  Fax: [phone]    Electronically signed by Vera Simmons MD on 06/18/2019 12:25 PM

## 2019-06-25 ENCOUNTER — ANESTHESIA EVENT (OUTPATIENT)
Dept: OPERATING ROOM | Age: 34
End: 2019-06-25
Payer: MEDICAID

## 2019-06-25 ENCOUNTER — ANESTHESIA (OUTPATIENT)
Dept: OPERATING ROOM | Age: 34
End: 2019-06-25
Payer: MEDICAID

## 2019-06-25 ENCOUNTER — HOSPITAL ENCOUNTER (OUTPATIENT)
Age: 34
Setting detail: OUTPATIENT SURGERY
Discharge: HOME | End: 2019-06-25
Attending: OBSTETRICS & GYNECOLOGY | Admitting: OBSTETRICS & GYNECOLOGY
Payer: MEDICAID

## 2019-06-25 VITALS
RESPIRATION RATE: 8 BRPM | SYSTOLIC BLOOD PRESSURE: 100 MMHG | DIASTOLIC BLOOD PRESSURE: 55 MMHG | OXYGEN SATURATION: 99 %

## 2019-06-25 VITALS
HEIGHT: 64 IN | BODY MASS INDEX: 23.73 KG/M2 | HEART RATE: 54 BPM | TEMPERATURE: 98 F | RESPIRATION RATE: 10 BRPM | OXYGEN SATURATION: 98 % | WEIGHT: 139 LBS | DIASTOLIC BLOOD PRESSURE: 74 MMHG | SYSTOLIC BLOOD PRESSURE: 129 MMHG

## 2019-06-25 DIAGNOSIS — D06.9 SEVERE CERVICAL DYSPLASIA: ICD-10-CM

## 2019-06-25 LAB
HCT VFR BLD CALC: 34.9 % (ref 36–48)
HEMOGLOBIN: 11.9 G/DL (ref 12–16)
MCH RBC QN AUTO: 32.5 PG (ref 26–34)
MCHC RBC AUTO-ENTMCNC: 34.1 G/DL (ref 31–36)
MCV RBC AUTO: 95.2 FL (ref 80–100)
PDW BLD-RTO: 13.9 % (ref 12.4–15.4)
PLATELET # BLD: 255 K/UL (ref 135–450)
PMV BLD AUTO: 8.5 FL (ref 5–10.5)
PREGNANCY, URINE: NEGATIVE
RBC # BLD: 3.67 M/UL (ref 4–5.2)
WBC # BLD: 11.9 K/UL (ref 4–11)

## 2019-06-25 PROCEDURE — 2500000003 HC RX 250 WO HCPCS: Performed by: OBSTETRICS & GYNECOLOGY

## 2019-06-25 PROCEDURE — 7100000011 HC PHASE II RECOVERY - ADDTL 15 MIN: Performed by: OBSTETRICS & GYNECOLOGY

## 2019-06-25 PROCEDURE — 6360000002 HC RX W HCPCS: Performed by: NURSE ANESTHETIST, CERTIFIED REGISTERED

## 2019-06-25 PROCEDURE — 3700000000 HC ANESTHESIA ATTENDED CARE: Performed by: OBSTETRICS & GYNECOLOGY

## 2019-06-25 PROCEDURE — 6370000000 HC RX 637 (ALT 250 FOR IP): Performed by: ANESTHESIOLOGY

## 2019-06-25 PROCEDURE — 7100000000 HC PACU RECOVERY - FIRST 15 MIN: Performed by: OBSTETRICS & GYNECOLOGY

## 2019-06-25 PROCEDURE — 7100000010 HC PHASE II RECOVERY - FIRST 15 MIN: Performed by: OBSTETRICS & GYNECOLOGY

## 2019-06-25 PROCEDURE — 2580000003 HC RX 258: Performed by: ANESTHESIOLOGY

## 2019-06-25 PROCEDURE — 2709999900 HC NON-CHARGEABLE SUPPLY: Performed by: OBSTETRICS & GYNECOLOGY

## 2019-06-25 PROCEDURE — 85027 COMPLETE CBC AUTOMATED: CPT

## 2019-06-25 PROCEDURE — 2500000003 HC RX 250 WO HCPCS: Performed by: NURSE ANESTHETIST, CERTIFIED REGISTERED

## 2019-06-25 PROCEDURE — 3600000014 HC SURGERY LEVEL 4 ADDTL 15MIN: Performed by: OBSTETRICS & GYNECOLOGY

## 2019-06-25 PROCEDURE — 3700000001 HC ADD 15 MINUTES (ANESTHESIA): Performed by: OBSTETRICS & GYNECOLOGY

## 2019-06-25 PROCEDURE — 7100000001 HC PACU RECOVERY - ADDTL 15 MIN: Performed by: OBSTETRICS & GYNECOLOGY

## 2019-06-25 PROCEDURE — 6370000000 HC RX 637 (ALT 250 FOR IP): Performed by: OBSTETRICS & GYNECOLOGY

## 2019-06-25 PROCEDURE — 2580000003 HC RX 258: Performed by: OBSTETRICS & GYNECOLOGY

## 2019-06-25 PROCEDURE — 88305 TISSUE EXAM BY PATHOLOGIST: CPT

## 2019-06-25 PROCEDURE — 2580000003 HC RX 258: Performed by: NURSE ANESTHETIST, CERTIFIED REGISTERED

## 2019-06-25 PROCEDURE — 88307 TISSUE EXAM BY PATHOLOGIST: CPT

## 2019-06-25 PROCEDURE — 84703 CHORIONIC GONADOTROPIN ASSAY: CPT

## 2019-06-25 PROCEDURE — 3600000004 HC SURGERY LEVEL 4 BASE: Performed by: OBSTETRICS & GYNECOLOGY

## 2019-06-25 RX ORDER — ONDANSETRON 2 MG/ML
INJECTION INTRAMUSCULAR; INTRAVENOUS PRN
Status: DISCONTINUED | OUTPATIENT
Start: 2019-06-25 | End: 2019-06-25 | Stop reason: SDUPTHER

## 2019-06-25 RX ORDER — LIDOCAINE HYDROCHLORIDE 10 MG/ML
0.3 INJECTION, SOLUTION EPIDURAL; INFILTRATION; INTRACAUDAL; PERINEURAL
Status: DISCONTINUED | OUTPATIENT
Start: 2019-06-25 | End: 2019-06-25 | Stop reason: HOSPADM

## 2019-06-25 RX ORDER — SODIUM CHLORIDE 0.9 % (FLUSH) 0.9 %
10 SYRINGE (ML) INJECTION PRN
Status: DISCONTINUED | OUTPATIENT
Start: 2019-06-25 | End: 2019-06-25 | Stop reason: HOSPADM

## 2019-06-25 RX ORDER — ONDANSETRON 2 MG/ML
4 INJECTION INTRAMUSCULAR; INTRAVENOUS PRN
Status: DISCONTINUED | OUTPATIENT
Start: 2019-06-25 | End: 2019-06-25 | Stop reason: HOSPADM

## 2019-06-25 RX ORDER — SODIUM CHLORIDE 0.9 % (FLUSH) 0.9 %
10 SYRINGE (ML) INJECTION EVERY 12 HOURS SCHEDULED
Status: DISCONTINUED | OUTPATIENT
Start: 2019-06-25 | End: 2019-06-25 | Stop reason: HOSPADM

## 2019-06-25 RX ORDER — PROMETHAZINE HYDROCHLORIDE 25 MG/ML
6.25 INJECTION, SOLUTION INTRAMUSCULAR; INTRAVENOUS
Status: DISCONTINUED | OUTPATIENT
Start: 2019-06-25 | End: 2019-06-25 | Stop reason: HOSPADM

## 2019-06-25 RX ORDER — OXYCODONE HYDROCHLORIDE AND ACETAMINOPHEN 5; 325 MG/1; MG/1
1 TABLET ORAL PRN
Status: COMPLETED | OUTPATIENT
Start: 2019-06-25 | End: 2019-06-25

## 2019-06-25 RX ORDER — MEPERIDINE HYDROCHLORIDE 50 MG/ML
12.5 INJECTION INTRAMUSCULAR; INTRAVENOUS; SUBCUTANEOUS EVERY 5 MIN PRN
Status: DISCONTINUED | OUTPATIENT
Start: 2019-06-25 | End: 2019-06-25 | Stop reason: HOSPADM

## 2019-06-25 RX ORDER — SODIUM CHLORIDE 9 MG/ML
INJECTION, SOLUTION INTRAVENOUS CONTINUOUS PRN
Status: DISCONTINUED | OUTPATIENT
Start: 2019-06-25 | End: 2019-06-25 | Stop reason: ALTCHOICE

## 2019-06-25 RX ORDER — FENTANYL CITRATE 50 UG/ML
INJECTION, SOLUTION INTRAMUSCULAR; INTRAVENOUS PRN
Status: DISCONTINUED | OUTPATIENT
Start: 2019-06-25 | End: 2019-06-25 | Stop reason: SDUPTHER

## 2019-06-25 RX ORDER — PROPOFOL 10 MG/ML
INJECTION, EMULSION INTRAVENOUS PRN
Status: DISCONTINUED | OUTPATIENT
Start: 2019-06-25 | End: 2019-06-25 | Stop reason: SDUPTHER

## 2019-06-25 RX ORDER — DIPHENHYDRAMINE HYDROCHLORIDE 50 MG/ML
12.5 INJECTION INTRAMUSCULAR; INTRAVENOUS
Status: DISCONTINUED | OUTPATIENT
Start: 2019-06-25 | End: 2019-06-25 | Stop reason: HOSPADM

## 2019-06-25 RX ORDER — ACETIC ACID 0.25 G/100ML
IRRIGANT IRRIGATION PRN
Status: DISCONTINUED | OUTPATIENT
Start: 2019-06-25 | End: 2019-06-25 | Stop reason: ALTCHOICE

## 2019-06-25 RX ORDER — KETOROLAC TROMETHAMINE 30 MG/ML
INJECTION, SOLUTION INTRAMUSCULAR; INTRAVENOUS PRN
Status: DISCONTINUED | OUTPATIENT
Start: 2019-06-25 | End: 2019-06-25 | Stop reason: SDUPTHER

## 2019-06-25 RX ORDER — LABETALOL HYDROCHLORIDE 5 MG/ML
5 INJECTION, SOLUTION INTRAVENOUS EVERY 10 MIN PRN
Status: DISCONTINUED | OUTPATIENT
Start: 2019-06-25 | End: 2019-06-25 | Stop reason: HOSPADM

## 2019-06-25 RX ORDER — OXYCODONE HYDROCHLORIDE AND ACETAMINOPHEN 5; 325 MG/1; MG/1
2 TABLET ORAL PRN
Status: COMPLETED | OUTPATIENT
Start: 2019-06-25 | End: 2019-06-25

## 2019-06-25 RX ORDER — HYDRALAZINE HYDROCHLORIDE 20 MG/ML
5 INJECTION INTRAMUSCULAR; INTRAVENOUS EVERY 10 MIN PRN
Status: DISCONTINUED | OUTPATIENT
Start: 2019-06-25 | End: 2019-06-25 | Stop reason: HOSPADM

## 2019-06-25 RX ORDER — LIDOCAINE HYDROCHLORIDE 20 MG/ML
INJECTION, SOLUTION INFILTRATION; PERINEURAL PRN
Status: DISCONTINUED | OUTPATIENT
Start: 2019-06-25 | End: 2019-06-25 | Stop reason: SDUPTHER

## 2019-06-25 RX ORDER — IODINE SOLUTION STRONG 5% (LUGOL'S) 5 %
SOLUTION ORAL PRN
Status: DISCONTINUED | OUTPATIENT
Start: 2019-06-25 | End: 2019-06-25 | Stop reason: ALTCHOICE

## 2019-06-25 RX ORDER — SODIUM CHLORIDE, SODIUM LACTATE, POTASSIUM CHLORIDE, CALCIUM CHLORIDE 600; 310; 30; 20 MG/100ML; MG/100ML; MG/100ML; MG/100ML
INJECTION, SOLUTION INTRAVENOUS CONTINUOUS PRN
Status: DISCONTINUED | OUTPATIENT
Start: 2019-06-25 | End: 2019-06-25 | Stop reason: SDUPTHER

## 2019-06-25 RX ORDER — SODIUM CHLORIDE, SODIUM LACTATE, POTASSIUM CHLORIDE, CALCIUM CHLORIDE 600; 310; 30; 20 MG/100ML; MG/100ML; MG/100ML; MG/100ML
INJECTION, SOLUTION INTRAVENOUS CONTINUOUS
Status: DISCONTINUED | OUTPATIENT
Start: 2019-06-25 | End: 2019-06-25 | Stop reason: HOSPADM

## 2019-06-25 RX ORDER — MIDAZOLAM HYDROCHLORIDE 1 MG/ML
INJECTION INTRAMUSCULAR; INTRAVENOUS PRN
Status: DISCONTINUED | OUTPATIENT
Start: 2019-06-25 | End: 2019-06-25 | Stop reason: SDUPTHER

## 2019-06-25 RX ORDER — DEXAMETHASONE SODIUM PHOSPHATE 4 MG/ML
INJECTION, SOLUTION INTRA-ARTICULAR; INTRALESIONAL; INTRAMUSCULAR; INTRAVENOUS; SOFT TISSUE PRN
Status: DISCONTINUED | OUTPATIENT
Start: 2019-06-25 | End: 2019-06-25 | Stop reason: SDUPTHER

## 2019-06-25 RX ORDER — MAGNESIUM HYDROXIDE 1200 MG/15ML
LIQUID ORAL CONTINUOUS PRN
Status: COMPLETED | OUTPATIENT
Start: 2019-06-25 | End: 2019-06-25

## 2019-06-25 RX ADMIN — PROPOFOL 200 MG: 10 INJECTION, EMULSION INTRAVENOUS at 08:47

## 2019-06-25 RX ADMIN — DEXAMETHASONE SODIUM PHOSPHATE 6 MG: 4 INJECTION, SOLUTION INTRAMUSCULAR; INTRAVENOUS at 08:50

## 2019-06-25 RX ADMIN — SODIUM CHLORIDE, POTASSIUM CHLORIDE, SODIUM LACTATE AND CALCIUM CHLORIDE: 600; 310; 30; 20 INJECTION, SOLUTION INTRAVENOUS at 08:44

## 2019-06-25 RX ADMIN — LIDOCAINE HYDROCHLORIDE 60 MG: 20 INJECTION, SOLUTION INFILTRATION; PERINEURAL at 08:47

## 2019-06-25 RX ADMIN — KETOROLAC TROMETHAMINE 30 MG: 30 INJECTION, SOLUTION INTRAMUSCULAR; INTRAVENOUS at 09:01

## 2019-06-25 RX ADMIN — SODIUM CHLORIDE, POTASSIUM CHLORIDE, SODIUM LACTATE AND CALCIUM CHLORIDE: 600; 310; 30; 20 INJECTION, SOLUTION INTRAVENOUS at 08:00

## 2019-06-25 RX ADMIN — OXYCODONE HYDROCHLORIDE AND ACETAMINOPHEN 1 TABLET: 5; 325 TABLET ORAL at 09:36

## 2019-06-25 RX ADMIN — ONDANSETRON 4 MG: 2 INJECTION INTRAMUSCULAR; INTRAVENOUS at 08:50

## 2019-06-25 RX ADMIN — FENTANYL CITRATE 50 MCG: 50 INJECTION INTRAMUSCULAR; INTRAVENOUS at 08:47

## 2019-06-25 RX ADMIN — MIDAZOLAM HYDROCHLORIDE 2 MG: 2 INJECTION, SOLUTION INTRAMUSCULAR; INTRAVENOUS at 08:44

## 2019-06-25 ASSESSMENT — PULMONARY FUNCTION TESTS
PIF_VALUE: 0
PIF_VALUE: 22
PIF_VALUE: 23
PIF_VALUE: 21
PIF_VALUE: 22
PIF_VALUE: 1
PIF_VALUE: 23
PIF_VALUE: 0
PIF_VALUE: 1
PIF_VALUE: 22
PIF_VALUE: 21
PIF_VALUE: 20
PIF_VALUE: 21
PIF_VALUE: 22
PIF_VALUE: 18
PIF_VALUE: 22
PIF_VALUE: 0
PIF_VALUE: 19

## 2019-06-25 ASSESSMENT — PAIN SCALES - GENERAL
PAINLEVEL_OUTOF10: 4
PAINLEVEL_OUTOF10: 5
PAINLEVEL_OUTOF10: 0

## 2019-06-25 ASSESSMENT — PAIN DESCRIPTION - DESCRIPTORS: DESCRIPTORS: ACHING

## 2019-06-25 ASSESSMENT — PAIN - FUNCTIONAL ASSESSMENT: PAIN_FUNCTIONAL_ASSESSMENT: 0-10

## 2019-06-25 NOTE — BRIEF OP NOTE
Department of Gynecology  Brief Operative Report        Pre-operative Diagnosis:  ANDREZ III    Post-operative Diagnosis:  Same    Procedure:  Leep    Surgeon:  Paradise Macias    Anesthesia:  General Laryngeal Mask Airway Anesthesia    Findings:  ACE along TZ    Estimated blood loss:  20ml    Specimens:  LEEP, tophat, ECC    Complications:  none    Dictation Number:  [id number]    See dictated operative report for full details.

## 2019-06-25 NOTE — PROGRESS NOTES
Discharge instructions reviewed with patient/responsible adult and understanding verbalized. Discharge instructions signed and copies given. Patient to be discharged home with belongings. Ayden at bedside.

## 2019-06-25 NOTE — ANESTHESIA PRE PROCEDURE
Problem List:    Patient Active Problem List   Diagnosis Code    Anxiety F41.9    Hiatal hernia K44.9    Functional diarrhea K59.1    Other constipation K59.09    Gastroesophageal reflux disease K21.9    Cervical dysplasia N87.9    Alternating constipation and diarrhea R19.8    Generalized abdominal pain R10.84    Dyspepsia R10.13    Non-intractable vomiting with nausea R11.2       Past Medical History:        Diagnosis Date    Bipolar affect, depressed ([address]     Depression     GERD (gastroesophageal reflux disease)     IBS (irritable bowel syndrome)        Past Surgical History:        Procedure Laterality Date    CHOLECYSTECTOMY         Social History:    Social History     Tobacco Use    Smoking status: Current Every Day Smoker     Packs/day: 0.50     Types: Cigarettes    Smokeless tobacco: Never Used   Substance Use Topics    Alcohol use: Yes     Alcohol/week: 0.0 - 0.6 oz     Frequency: Monthly or less     Comment: social - one every 2 -3 months                                Ready to quit: Not Answered  Counseling given: Not Answered      Vital Signs (Current):   Vitals:    06/21/19 1109 06/25/19 0718   BP:  106/69   Resp:  16   Temp:  98 °F (36.7 °C)   TempSrc:  Temporal   SpO2:  100%   Weight: 139 lb (63 kg) 139 lb (63 kg)   Height: 5' 4\" (1.626 m) 5' 4\" (1.626 m)                                              BP Readings from Last 3 Encounters:   06/25/19 106/69   06/17/19 114/70   06/05/19 110/60       NPO Status:                                                                                 BMI:   Wt Readings from Last 3 Encounters:   06/25/19 139 lb (63 kg)   06/17/19 139 lb (63 kg)   06/05/19 140 lb 9.6 oz (63.8 kg)     Body mass index is 23.86 kg/m².     CBC:   Lab Results   Component Value Date    WBC 11.9 06/25/2019    RBC 3.67 06/25/2019    HGB 11.9 06/25/2019    HCT 34.9 06/25/2019    MCV 95.2 06/25/2019    RDW 13.9 06/25/2019     06/25/2019       CMP:   Lab Results Component Value Date     03/14/2018    K 4.1 03/14/2018     03/14/2018    CO2 23 03/14/2018    BUN 8 03/14/2018    CREATININE <0.5 03/14/2018    GFRAA >60 03/14/2018    AGRATIO 1.3 03/14/2018    LABGLOM >60 03/14/2018    GLUCOSE 106 03/14/2018    PROT 7.7 03/14/2018    CALCIUM 9.4 03/14/2018    BILITOT 0.4 03/14/2018    ALKPHOS 73 03/14/2018    AST 17 03/14/2018    ALT 9 03/14/2018       POC Tests: No results for input(s): POCGLU, POCNA, POCK, POCCL, POCBUN, POCHEMO, POCHCT in the last 72 hours. Coags: No results found for: PROTIME, INR, APTT    HCG (If Applicable):   Lab Results   Component Value Date    PREGTESTUR negative 05/19/2016        ABGs: No results found for: PHART, PO2ART, LPX3PJT, GIE0AJP, BEART, X0EAGRHQ     Type & Screen (If Applicable):  No results found for: [facility]    Anesthesia Evaluation  Patient summary reviewed and Nursing notes reviewed no history of anesthetic complications:   Airway: Mallampati: II        Dental: normal exam         Pulmonary:Negative Pulmonary ROS and normal exam                               Cardiovascular:Negative CV ROS                      Neuro/Psych:   Negative Neuro/Psych ROS  (+) neuromuscular disease:, psychiatric history:            GI/Hepatic/Renal: Neg GI/Hepatic/Renal ROS  (+) hiatal hernia, GERD:,           Endo/Other: Negative Endo/Other ROS                    Abdominal:           Vascular: negative vascular ROS. Anesthesia Plan      general     ASA 2     (I discussed with the patient the risks and benefits of PIV, general anesthesia, IV Narcotics, PACU. All questions were answered, the patient agrees with the plan.)  Induction: intravenous. MIPS: Postoperative opioids intended and Prophylactic antiemetics administered. Anesthetic plan and risks discussed with patient.                       Hitesh Snowden MD   6/25/2019

## 2019-06-25 NOTE — ANESTHESIA POSTPROCEDURE EVALUATION
Department of Anesthesiology  Postprocedure Note    Patient: Mary Alice Lantigua  MRN: [id number]  YOB: 1985  Date of evaluation: 6/25/2019  Time:  9:23 AM     Procedure Summary     Date:  06/25/19 Room / Location:  [address] [facility] OR    Anesthesia Start:  0305 Anesthesia Stop:  0901    Procedure:  LOOP ELECTROSURGICAL EXCISION PROCEDURE (N/A Vagina ) Diagnosis:       Severe cervical dysplasia      (SEVERE CERVICAL DYSPLASIA)    Surgeon:  Jacquelynn Gilford, MD Responsible Provider:  Kevin Fontenot MD    Anesthesia Type:  general ASA Status:  2          Anesthesia Type: general    Sully Phase I: Sully Score: 9    Sully Phase II:      Last vitals: Reviewed and per EMR flowsheets. Anesthesia Post Evaluation    Patient location during evaluation: PACU  Patient participation: complete - patient participated  Level of consciousness: awake  Airway patency: patent  Nausea & Vomiting: no nausea and no vomiting  Complications: no  Cardiovascular status: blood pressure returned to baseline  Respiratory status: acceptable  Hydration status: euvolemic  Comments: Vital signs stable upon transfer to Stage 2 recovery.

## 2019-06-25 NOTE — OP NOTE
[address] [address]                                OPERATIVE REPORT    PATIENT NAME: Yani Kelley                  :        1985  MED REC NO:   [id number]                          ROOM:  ACCOUNT NO:   [de-identified]                           ADMIT DATE: 2019  PROVIDER:     Angie Thompson MD    DATE OF PROCEDURE:  2019    PREOPERATIVE DIAGNOSIS:  ANDREZ (cervical intraepithelial neoplasia) 3. POSTOPERATIVE DIAGNOSIS:  ANDREZ (cervical intraepithelial neoplasia) 3. OPERATION PERFORMED:  LEEP. SURGEON:  Angie Thompson MD    ANESTHESIA:  General LMA. COMPLICATIONS:  None. ESTIMATED BLOOD LOSS:  20 mL. FINDINGS:  Acetowhite epithelium along the transformation zone. SPECIMENS:  1. LEEP. 2.  Top-hat. 3.  ECC. INDICATIONS:  The patient is a 70-year-old with several-year history of  high risk HPV of 2016. PAP smear was LSIL with high risk HPV. Colposcopy revealed ANDREZ 1 of 2017. Her PAP smear was NIL but high  risk HPV on . Repeat PAP smear 2018 was LSIL with high risk  HPV. Colposcopy was not then performed until 2019 with ANDREZ 1 on  biopsy but it suggested ANDREZ 3 on ECC with evidence of progression. Recommendation was made for LEEP for further diagnosis and treatment. The patient agreed. Risks, benefits, and alternatives of procedure were  discussed. Questions were answered. Consent was signed. OPERATIVE PROCEDURE:  The patient was taken to the operating room. She  was placed under general anesthesia with SCDs on and working. When this  was adequate, she was placed in the dorsal lithotomy position and  prepped externally. A coated speculum was placed inside of the vagina. Acetic acid was placed on the cervix and colposcopy was performed with  the above findings. A 20 x 15 loop was used to excise the ectocervix.    A 1 x 1 loop was used to excise the top-hat and ECC was then performed. Rollerball cautery was used for excellent hemostasis and Monsel's was  placed. The patient tolerated the procedure well. Sponge, lap, and  needle counts were correct x2. I performed the procedure.         Trinidad Garcia MD    D: 06/25/2019 9:10:00       T: 06/25/2019 18:02:20     [email or web address]  Job#: [id number]     Doc#: [id number]    CC:

## 2019-06-25 NOTE — PROGRESS NOTES
Pt. Was up to the BR to have a bowel movement and feels better. Dressing to go home and fresh kevan pad given.

## 2019-07-04 DIAGNOSIS — K21.9 GASTROESOPHAGEAL REFLUX DISEASE, ESOPHAGITIS PRESENCE NOT SPECIFIED: ICD-10-CM

## 2019-07-05 RX ORDER — OMEPRAZOLE 40 MG/1
40 CAPSULE, DELAYED RELEASE ORAL
Qty: 30 CAPSULE | Refills: 0 | Status: SHIPPED | OUTPATIENT
Start: 2019-07-05 | End: 2019-08-21 | Stop reason: SDUPTHER

## 2019-07-06 ENCOUNTER — HOSPITAL ENCOUNTER (EMERGENCY)
Age: 34
Discharge: HOME | End: 2019-07-06
Attending: EMERGENCY MEDICINE
Payer: MEDICAID

## 2019-07-06 VITALS
BODY MASS INDEX: 23.73 KG/M2 | WEIGHT: 139 LBS | DIASTOLIC BLOOD PRESSURE: 69 MMHG | RESPIRATION RATE: 14 BRPM | HEART RATE: 61 BPM | HEIGHT: 64 IN | TEMPERATURE: 98.4 F | SYSTOLIC BLOOD PRESSURE: 124 MMHG | OXYGEN SATURATION: 99 %

## 2019-07-06 DIAGNOSIS — N93.9 EPISODE OF HEAVY VAGINAL BLEEDING: Primary | ICD-10-CM

## 2019-07-06 DIAGNOSIS — Z98.890 S/P LOOP ELECTROSURGICAL EXCISION PROCEDURE: ICD-10-CM

## 2019-07-06 LAB
A/G RATIO: 1.4 (ref 1.1–2.2)
ALBUMIN SERPL-MCNC: 4.9 G/DL (ref 3.4–5)
ALP BLD-CCNC: 75 U/L (ref 40–129)
ALT SERPL-CCNC: 6 U/L (ref 10–40)
ANION GAP SERPL CALCULATED.3IONS-SCNC: 14 MMOL/L (ref 3–16)
AST SERPL-CCNC: 9 U/L (ref 15–37)
BASOPHILS ABSOLUTE: 0 K/UL (ref 0–0.2)
BASOPHILS RELATIVE PERCENT: 0.3 %
BILIRUB SERPL-MCNC: 0.3 MG/DL (ref 0–1)
BUN BLDV-MCNC: 9 MG/DL (ref 7–20)
CALCIUM SERPL-MCNC: 10 MG/DL (ref 8.3–10.6)
CHLORIDE BLD-SCNC: 102 MMOL/L (ref 99–110)
CO2: 21 MMOL/L (ref 21–32)
CREAT SERPL-MCNC: 0.6 MG/DL (ref 0.6–1.1)
EOSINOPHILS ABSOLUTE: 0.1 K/UL (ref 0–0.6)
EOSINOPHILS RELATIVE PERCENT: 1.2 %
GFR AFRICAN AMERICAN: >60
GFR NON-AFRICAN AMERICAN: >60
GLOBULIN: 3.5 G/DL
GLUCOSE BLD-MCNC: 119 MG/DL (ref 70–99)
HCG QUALITATIVE: NEGATIVE
HCT VFR BLD CALC: 32.4 % (ref 36–48)
HCT VFR BLD CALC: 37.1 % (ref 36–48)
HEMOGLOBIN: 10.8 G/DL (ref 12–16)
HEMOGLOBIN: 12.3 G/DL (ref 12–16)
LYMPHOCYTES ABSOLUTE: 2.9 K/UL (ref 1–5.1)
LYMPHOCYTES RELATIVE PERCENT: 32.8 %
MCH RBC QN AUTO: 32.1 PG (ref 26–34)
MCHC RBC AUTO-ENTMCNC: 33.3 G/DL (ref 31–36)
MCV RBC AUTO: 96.5 FL (ref 80–100)
MONOCYTES ABSOLUTE: 0.7 K/UL (ref 0–1.3)
MONOCYTES RELATIVE PERCENT: 8 %
NEUTROPHILS ABSOLUTE: 5.2 K/UL (ref 1.7–7.7)
NEUTROPHILS RELATIVE PERCENT: 57.7 %
PDW BLD-RTO: 14.1 % (ref 12.4–15.4)
PLATELET # BLD: 279 K/UL (ref 135–450)
PMV BLD AUTO: 8.1 FL (ref 5–10.5)
POTASSIUM SERPL-SCNC: 3.5 MMOL/L (ref 3.5–5.1)
RBC # BLD: 3.85 M/UL (ref 4–5.2)
SODIUM BLD-SCNC: 137 MMOL/L (ref 136–145)
SPECIMEN STATUS: NORMAL
TOTAL PROTEIN: 8.4 G/DL (ref 6.4–8.2)
WBC # BLD: 9 K/UL (ref 4–11)

## 2019-07-06 PROCEDURE — 84703 CHORIONIC GONADOTROPIN ASSAY: CPT

## 2019-07-06 PROCEDURE — 6370000000 HC RX 637 (ALT 250 FOR IP): Performed by: PHYSICIAN ASSISTANT

## 2019-07-06 PROCEDURE — 2580000003 HC RX 258: Performed by: PHYSICIAN ASSISTANT

## 2019-07-06 PROCEDURE — 85014 HEMATOCRIT: CPT

## 2019-07-06 PROCEDURE — 99284 EMERGENCY DEPT VISIT MOD MDM: CPT

## 2019-07-06 PROCEDURE — 80053 COMPREHEN METABOLIC PANEL: CPT

## 2019-07-06 PROCEDURE — 96374 THER/PROPH/DIAG INJ IV PUSH: CPT

## 2019-07-06 PROCEDURE — 85018 HEMOGLOBIN: CPT

## 2019-07-06 PROCEDURE — 85025 COMPLETE CBC W/AUTO DIFF WBC: CPT

## 2019-07-06 PROCEDURE — 6360000002 HC RX W HCPCS: Performed by: PHYSICIAN ASSISTANT

## 2019-07-06 PROCEDURE — 96375 TX/PRO/DX INJ NEW DRUG ADDON: CPT

## 2019-07-06 PROCEDURE — 96361 HYDRATE IV INFUSION ADD-ON: CPT

## 2019-07-06 RX ORDER — 0.9 % SODIUM CHLORIDE 0.9 %
1000 INTRAVENOUS SOLUTION INTRAVENOUS ONCE
Status: COMPLETED | OUTPATIENT
Start: 2019-07-06 | End: 2019-07-06

## 2019-07-06 RX ORDER — OXYCODONE HYDROCHLORIDE AND ACETAMINOPHEN 5; 325 MG/1; MG/1
1 TABLET ORAL ONCE
Status: COMPLETED | OUTPATIENT
Start: 2019-07-06 | End: 2019-07-06

## 2019-07-06 RX ORDER — NORGESTIMATE AND ETHINYL ESTRADIOL 0.25-0.035
KIT ORAL
Qty: 2 PACKET | Refills: 0 | Status: SHIPPED | OUTPATIENT
Start: 2019-07-06 | End: 2019-09-05 | Stop reason: ALTCHOICE

## 2019-07-06 RX ORDER — ONDANSETRON 2 MG/ML
4 INJECTION INTRAMUSCULAR; INTRAVENOUS ONCE
Status: COMPLETED | OUTPATIENT
Start: 2019-07-06 | End: 2019-07-06

## 2019-07-06 RX ADMIN — ONDANSETRON 4 MG: 2 INJECTION INTRAMUSCULAR; INTRAVENOUS at 19:57

## 2019-07-06 RX ADMIN — OXYCODONE HYDROCHLORIDE AND ACETAMINOPHEN 1 TABLET: 5; 325 TABLET ORAL at 19:57

## 2019-07-06 RX ADMIN — CONJUGATED ESTROGENS 20 MG: 25 INJECTION, POWDER, LYOPHILIZED, FOR SOLUTION INTRAMUSCULAR; INTRAVENOUS at 20:22

## 2019-07-06 RX ADMIN — SODIUM CHLORIDE 1000 ML: 9 INJECTION, SOLUTION INTRAVENOUS at 19:58

## 2019-07-06 ASSESSMENT — ENCOUNTER SYMPTOMS
COLOR CHANGE: 0
EYES NEGATIVE: 1
SHORTNESS OF BREATH: 0
ABDOMINAL PAIN: 0

## 2019-07-06 ASSESSMENT — PAIN SCALES - GENERAL
PAINLEVEL_OUTOF10: 7
PAINLEVEL_OUTOF10: 7
PAINLEVEL_OUTOF10: 2
PAINLEVEL_OUTOF10: 0

## 2019-07-06 NOTE — ED PROVIDER NOTES
[facility]  ED  EMERGENCY DEPARTMENT ENCOUNTER        Pt Name: Issa Anders  MRN: [id number]  [place] 1985  Date of evaluation: 7/6/2019  Provider: Hernando Thibodeaux PA-C  PCP: Andrews Bloom MD  ED Attending: Ray Damon DO      This patient was seen by the attending provider Ray Damon DO    History provided by the patient    CHIEF COMPLAINT:     Chief Complaint   Patient presents with    Post-op Problem     Pt reports on 6-25 had LEEP with Dr Jadiel James, recovery had been going well until today, severe bleeding started at 1600-4 pads, pt reports at 1700 severe pain in pelvis that radiates into lower back. HISTORY OF PRESENT ILLNESS:      Issa Anders is a 35 y.o. female who arrives to the ED by private vehicle. The patient is here reporting heavy vaginal bleeding that started at 4 PM.  She underwent LOOP procedure by Dr. Jadiel James on 6/25/2019. This was done secondary to severe cervical dysplasia. The patient had been recovering well. She reported wearing pads daily but having just mild vaginal bleeding. She has not used tampons she has not had sexual intercourse since the procedure. She started having severe, heavy bleeding just this afternoon and quickly bled through 4 pads passing bright red blood and large clots. Around 5 PM she started experiencing associated pelvic pain and low back pain. She cannot identify what could have caused this sudden bleeding after it had been over a week since her procedure. She is now feeling a little lightheaded. She has not had any syncopal episodes. Nursing Notes were reviewed     REVIEW OF SYSTEMS:     Review of Systems   Constitutional: Negative for activity change, appetite change, chills and fever. HENT: Negative. Eyes: Negative. Respiratory: Negative for shortness of breath. Cardiovascular: Negative for chest pain. Gastrointestinal: Negative for abdominal pain.    Genitourinary: Positive for pelvic pain Comment: social - one every 2 -3 months    Drug use: No    Sexual activity: Yes     Birth control/protection: Injection   Lifestyle    Physical activity:     Days per week: None     Minutes per session: None    Stress: None   Relationships    Social connections:     Talks on phone: None     Gets together: None     Attends Quaker service: None     Active member of club or organization: None     Attends meetings of clubs or organizations: None     Relationship status: None    Intimate partner violence:     Fear of current or ex partner: None     Emotionally abused: None     Physically abused: None     Forced sexual activity: None   Other Topics Concern    None   Social History Narrative    None       SCREENINGS:             PHYSICAL EXAM:       ED Triage Vitals [07/06/19 1830]   BP Temp Temp Source Pulse Resp SpO2 Height Weight   130/87 98.4 °F (36.9 °C) Oral 60 16 99 % 5' 4\" (1.626 m) 139 lb (63 kg)       Physical Exam    CONSTITUTIONAL: Awake and alert. Cooperative. Well-developed. Well-nourished. Non-toxic. No acute distress. HENT: Normocephalic. Atraumatic. External ears normal, without discharge. No nasal discharge. Oropharynx clear. Mucous membranes moist.  EYES: Conjunctiva non-injected. No scleral icterus. PERRL. EOM's grossly intact. NECK: Supple. Normal ROM. CARDIOVASCULAR: RRR. No Murmer. Intact distal pulses. PULMONARY/CHEST WALL: Effort normal. No tachypnea. Lungs clear to ausculation. ABDOMEN: Normal BS. Soft. Nondistended. No tenderness to palpate. No guarding. /ANORECTAL: Pelvic exam done with chaperone present. On initial exam of the patient's external genitalia she is having heavy bleeding from the vaginal vault including both bright red blood and significant clots. I ultimately cleared all of this from the patient's vaginal vault before being able to visualize the cervix.   To the center of the cervix there is a dark clot/vascular appearing area with persistent seeping of

## 2019-08-21 RX ORDER — PROMETHAZINE HYDROCHLORIDE 12.5 MG/1
TABLET ORAL
Qty: 30 TABLET | Refills: 0 | Status: SHIPPED | OUTPATIENT
Start: 2019-08-21 | End: 2019-09-13 | Stop reason: SDUPTHER

## 2019-09-05 ENCOUNTER — OFFICE VISIT (OUTPATIENT)
Dept: FAMILY MEDICINE CLINIC | Age: 34
End: 2019-09-05
Payer: MEDICAID

## 2019-09-05 VITALS
BODY MASS INDEX: 24.55 KG/M2 | WEIGHT: 143 LBS | SYSTOLIC BLOOD PRESSURE: 110 MMHG | OXYGEN SATURATION: 99 % | DIASTOLIC BLOOD PRESSURE: 70 MMHG | HEART RATE: 71 BPM

## 2019-09-05 DIAGNOSIS — Z00.00 HEALTHCARE MAINTENANCE: ICD-10-CM

## 2019-09-05 DIAGNOSIS — F41.1 GAD (GENERALIZED ANXIETY DISORDER): Primary | ICD-10-CM

## 2019-09-05 PROCEDURE — 99214 OFFICE O/P EST MOD 30 MIN: CPT | Performed by: FAMILY MEDICINE

## 2019-09-05 PROCEDURE — G8427 DOCREV CUR MEDS BY ELIG CLIN: HCPCS | Performed by: FAMILY MEDICINE

## 2019-09-05 PROCEDURE — 90732 PPSV23 VACC 2 YRS+ SUBQ/IM: CPT | Performed by: FAMILY MEDICINE

## 2019-09-05 PROCEDURE — 90471 IMMUNIZATION ADMIN: CPT | Performed by: FAMILY MEDICINE

## 2019-09-05 PROCEDURE — 4004F PT TOBACCO SCREEN RCVD TLK: CPT | Performed by: FAMILY MEDICINE

## 2019-09-05 PROCEDURE — G8420 CALC BMI NORM PARAMETERS: HCPCS | Performed by: FAMILY MEDICINE

## 2019-09-05 RX ORDER — BUSPIRONE HYDROCHLORIDE 5 MG/1
5 TABLET ORAL 2 TIMES DAILY
Qty: 60 TABLET | Refills: 0 | Status: SHIPPED | OUTPATIENT
Start: 2019-09-05 | End: 2019-10-05

## 2019-09-05 ASSESSMENT — ENCOUNTER SYMPTOMS
NAUSEA: 0
VOMITING: 0
BACK PAIN: 0
COLOR CHANGE: 0
ABDOMINAL PAIN: 0
SHORTNESS OF BREATH: 0
CHEST TIGHTNESS: 0

## 2019-09-05 NOTE — PROGRESS NOTES
9/5/2019    This is a 29 y.o. female who presents for  Chief Complaint   Patient presents with    Anxiety    Medication Problem       HPI:     Felt the zoloft made her more anxious and jittery about 30 mins after admin  Feels she is not depressed and this medication was tolerated in the past when she was depressed  Would like to switch medicines to something for anxiety  Has not tried any other medications  No SI/HI  Interested in therapy     Past Medical History:   Diagnosis Date    Bipolar affect, depressed ([address]     Depression     GERD (gastroesophageal reflux disease)     IBS (irritable bowel syndrome)        Past Surgical History:   Procedure Laterality Date    CHOLECYSTECTOMY      LEEP N/A 6/25/2019    LOOP ELECTROSURGICAL EXCISION PROCEDURE performed by Teja Taylor MD at [place] [address] History     Socioeconomic History    Marital status: Single     Spouse name: Not on file    Number of children: Not on file    Years of education: Not on file    Highest education level: Not on file   Occupational History    Not on file   Social Needs    Financial resource strain: Not on file    Food insecurity:     Worry: Not on file     Inability: Not on file    Transportation needs:     Medical: Not on file     Non-medical: Not on file   Tobacco Use    Smoking status: Current Every Day Smoker     Packs/day: 0.50     Types: Cigarettes    Smokeless tobacco: Never Used   Substance and Sexual Activity    Alcohol use:  Yes     Alcohol/week: 0.0 - 1.0 standard drinks     Frequency: Monthly or less     Comment: social - one every 2 -3 months    Drug use: No    Sexual activity: Yes     Birth control/protection: Injection   Lifestyle    Physical activity:     Days per week: Not on file     Minutes per session: Not on file    Stress: Not on file   Relationships    Social connections:     Talks on phone: Not on file     Gets together: Not on file     Attends Oriental orthodox service: Not on file     Active member of club or organization: Not on file     Attends meetings of clubs or organizations: Not on file     Relationship status: Not on file    Intimate partner violence:     Fear of current or ex partner: Not on file     Emotionally abused: Not on file     Physically abused: Not on file     Forced sexual activity: Not on file   Other Topics Concern    Not on file   Social History Narrative    Not on file       Family History   Problem Relation Age of Onset    Cancer Mother 48        RCC       Current Outpatient Medications   Medication Sig Dispense Refill    busPIRone (BUSPAR) 5 MG tablet Take 1 tablet by mouth 2 times daily 60 tablet 0    sertraline (ZOLOFT) 50 MG tablet TAKE ONE TABLET BY MOUTH DAILY 90 tablet 1    omeprazole (PRILOSEC) 40 MG delayed release capsule TAKE ONE CAPSULE BY MOUTH EVERY MORNING BEFORE BREAKFAST 90 capsule 1    promethazine (PHENERGAN) 12.5 MG tablet TAKE ONE TABLET BY MOUTH FOUR TIMES A DAY AS NEEDED FOR NAUSEA 30 tablet 0    acetaminophen (TYLENOL) 500 MG tablet Take 500 mg by mouth every 6 hours as needed for Pain       No current facility-administered medications for this visit. There is no immunization history for the selected administration types on file for this patient. Allergies   Allergen Reactions    Effexor [Venlafaxine] Anaphylaxis    Morphine Anaphylaxis    Food      Mckayla cherries       Review of Systems   Constitutional: Negative for activity change, appetite change, fatigue and unexpected weight change. Respiratory: Negative for chest tightness and shortness of breath. Cardiovascular: Negative for chest pain and palpitations. Gastrointestinal: Negative for abdominal pain, nausea and vomiting. Musculoskeletal: Negative for arthralgias and back pain. Skin: Negative for color change. Neurological: Negative for dizziness, tremors, seizures, weakness, light-headedness, numbness and headaches.    Psychiatric/Behavioral: Negative for agitation,

## 2019-09-05 NOTE — PATIENT INSTRUCTIONS
Incorporated. Care instructions adapted under license by [facility] ([facility]). If you have questions about a medical condition or this instruction, always ask your healthcare professional. [address] any warranty or liability for your use of this information. Patient Education        buspirone  Pronunciation:  judromel ware  Brand: BuSpar  What is the most important information I should know about buspirone? Do not use buspirone if you have taken an MAO inhibitor in the past 14 days. A dangerous drug interaction could occur. MAO inhibitors include isocarboxazid, linezolid, methylene blue injection, phenelzine, rasagiline, selegiline, and tranylcypromine. What is buspirone? Buspirone is an anti-anxiety medicine that affects chemicals in the brain that may be unbalanced in people with anxiety. Buspirone is used to treat symptoms of anxiety, such as fear, tension, irritability, dizziness, pounding heartbeat, and other physical symptoms. Buspirone is not an anti-psychotic medication and should not be used in place of medication prescribed by your doctor for mental illness. Buspirone may also be used for purposes not listed in this medication guide. What should I discuss with my healthcare provider before taking buspirone? You should not use buspirone if you are allergic to it. Do not use buspirone if you have taken an MAO inhibitor in the past 14 days. A dangerous drug interaction could occur. MAO inhibitors include isocarboxazid, linezolid, methylene blue injection, phenelzine, rasagiline, selegiline, and tranylcypromine. To make sure buspirone is safe for you, tell your doctor if you have any of these conditions:  · kidney disease; or  · liver disease. Buspirone is not expected to harm an unborn baby. Tell your doctor if you are pregnant or plan to become pregnant during treatment. It is not known whether buspirone passes into breast milk or if it could harm a nursing baby.  Tell lead to unwanted side effects. Discuss the use of grapefruit products with your doctor. What are the possible side effects of buspirone? Get emergency medical help if you have signs of an allergic reaction: hives; difficult breathing; swelling of your face, lips, tongue, or throat. Call your doctor at once if you have:  · chest pain;  · shortness of breath; or  · a light-headed feeling, like you might pass out. Common side effects may include:  · headache;  · dizziness, drowsiness;  · sleep problems (insomnia);  · nausea, upset stomach; or  · feeling nervous or excited. This is not a complete list of side effects and others may occur. Call your doctor for medical advice about side effects. You may report side effects to FDA at 0-905-FDA-7926. What other drugs will affect buspirone? Taking this medicine with other drugs that make you sleepy or slow your breathing can worsen these effects. Ask your doctor before taking buspirone with a sleeping pill, narcotic pain medicine, muscle relaxer, or medicine for anxiety, depression, or seizures. Other drugs may interact with buspirone, including prescription and over-the-counter medicines, vitamins, and herbal products. Tell each of your health care providers about all medicines you use now and any medicine you start or stop using. Where can I get more information? Your pharmacist can provide more information about buspirone. Remember, keep this and all other medicines out of the reach of children, never share your medicines with others, and use this medication only for the indication prescribed. Every effort has been made to ensure that the information provided by [address] is accurate, up-to-date, and complete, but no guarantee is made to that effect. Drug information contained herein may be time sensitive.  [facility] information has been compiled for use by healthcare practitioners and consumers in the [place] and therefore [facility] does not warrant that uses outside of the [place] are appropriate, unless specifically indicated otherwise. [facility] drug information does not endorse drugs, diagnose patients or recommend therapy. [facility] drug information is an informational resource designed to assist licensed healthcare practitioners in caring for their patients and/or to serve consumers viewing this service as a supplement to, and not a substitute for, the expertise, skill, knowledge and judgment of healthcare practitioners. The absence of a warning for a given drug or drug combination in no way should be construed to indicate that the drug or drug combination is safe, effective or appropriate for any given patient. [facility] does not assume any responsibility for any aspect of healthcare administered with the aid of information [facility] provides. The information contained herein is not intended to cover all possible uses, directions, precautions, warnings, drug interactions, allergic reactions, or adverse effects. If you have questions about the drugs you are taking, check with your doctor, nurse or pharmacist.  Copyright 1531-9105 [address]. Version: 5.01. Revision date: 12/14/2015. Care instructions adapted under license by [facility] ([facility]). If you have questions about a medical condition or this instruction, always ask your healthcare professional. [place] [address] any warranty or liability for your use of this information.

## 2019-09-13 RX ORDER — PROMETHAZINE HYDROCHLORIDE 12.5 MG/1
TABLET ORAL
Qty: 30 TABLET | Refills: 1 | Status: SHIPPED | OUTPATIENT
Start: 2019-09-13 | End: 2019-10-12 | Stop reason: SDUPTHER

## 2019-10-03 ENCOUNTER — OFFICE VISIT (OUTPATIENT)
Dept: FAMILY MEDICINE CLINIC | Age: 34
End: 2019-10-03
Payer: MEDICAID

## 2019-10-03 VITALS
WEIGHT: 145.4 LBS | SYSTOLIC BLOOD PRESSURE: 108 MMHG | BODY MASS INDEX: 24.82 KG/M2 | DIASTOLIC BLOOD PRESSURE: 70 MMHG | OXYGEN SATURATION: 98 % | HEART RATE: 74 BPM | HEIGHT: 64 IN

## 2019-10-03 DIAGNOSIS — F41.9 ANXIETY: Primary | ICD-10-CM

## 2019-10-03 PROCEDURE — 99214 OFFICE O/P EST MOD 30 MIN: CPT | Performed by: FAMILY MEDICINE

## 2019-10-03 PROCEDURE — 4004F PT TOBACCO SCREEN RCVD TLK: CPT | Performed by: FAMILY MEDICINE

## 2019-10-03 PROCEDURE — G8420 CALC BMI NORM PARAMETERS: HCPCS | Performed by: FAMILY MEDICINE

## 2019-10-03 PROCEDURE — G8427 DOCREV CUR MEDS BY ELIG CLIN: HCPCS | Performed by: FAMILY MEDICINE

## 2019-10-03 PROCEDURE — G8484 FLU IMMUNIZE NO ADMIN: HCPCS | Performed by: FAMILY MEDICINE

## 2019-10-03 RX ORDER — HYDROXYZINE HYDROCHLORIDE 25 MG/1
25-50 TABLET, FILM COATED ORAL NIGHTLY PRN
Qty: 20 TABLET | Refills: 0 | Status: SHIPPED | OUTPATIENT
Start: 2019-10-03 | End: 2019-10-12 | Stop reason: SDUPTHER

## 2019-10-03 RX ORDER — CITALOPRAM 10 MG/1
10 TABLET ORAL DAILY
Qty: 30 TABLET | Refills: 1 | Status: SHIPPED | OUTPATIENT
Start: 2019-10-03 | End: 2020-09-04

## 2019-10-03 ASSESSMENT — ENCOUNTER SYMPTOMS
ABDOMINAL PAIN: 0
BACK PAIN: 0
CHEST TIGHTNESS: 0
VOMITING: 0
COLOR CHANGE: 0
SHORTNESS OF BREATH: 0
NAUSEA: 0

## 2020-07-06 RX ORDER — PROMETHAZINE HYDROCHLORIDE 12.5 MG/1
TABLET ORAL
Qty: 30 TABLET | Refills: 1 | Status: SHIPPED | OUTPATIENT
Start: 2020-07-06 | End: 2020-08-12

## 2020-07-06 NOTE — TELEPHONE ENCOUNTER
Last office visit: 10/3/2019    Last written: 3/30/2020 #30 2 refills    Future office visit: Visit date not found    Requested Prescriptions     Pending Prescriptions Disp Refills    promethazine (PHENERGAN) 12.5 MG tablet [Pharmacy Med Name: PROMETHAZINE 12.5 MG TABLET] 30 tablet 1     Sig: TAKE ONE TABLET BY MOUTH FOUR TIMES A DAY AS NEEDED FOR NAUSEA

## 2020-07-29 ENCOUNTER — OFFICE VISIT (OUTPATIENT)
Dept: PRIMARY CARE CLINIC | Age: 35
End: 2020-07-29
Payer: MEDICAID

## 2020-07-29 PROCEDURE — G8428 CUR MEDS NOT DOCUMENT: HCPCS | Performed by: NURSE PRACTITIONER

## 2020-07-29 PROCEDURE — 99211 OFF/OP EST MAY X REQ PHY/QHP: CPT | Performed by: NURSE PRACTITIONER

## 2020-07-29 PROCEDURE — G8420 CALC BMI NORM PARAMETERS: HCPCS | Performed by: NURSE PRACTITIONER

## 2020-07-29 NOTE — PROGRESS NOTES
Danisha Hubbard received a viral test for COVID-19. They were educated on isolation and quarantine as appropriate. For any symptoms, they were directed to seek care from their PCP, given contact information to establish with a doctor, directed to an urgent care or the emergency room.

## 2020-07-30 LAB
REPORT: NORMAL
SARS-COV-2: NOT DETECTED
THIS TEST SENT TO: NORMAL

## 2020-09-03 ENCOUNTER — OFFICE VISIT (OUTPATIENT)
Dept: FAMILY MEDICINE CLINIC | Age: 35
End: 2020-09-03
Payer: MEDICAID

## 2020-09-03 PROCEDURE — G8427 DOCREV CUR MEDS BY ELIG CLIN: HCPCS | Performed by: NURSE PRACTITIONER

## 2020-09-03 PROCEDURE — 99213 OFFICE O/P EST LOW 20 MIN: CPT | Performed by: NURSE PRACTITIONER

## 2020-09-03 PROCEDURE — 4004F PT TOBACCO SCREEN RCVD TLK: CPT | Performed by: NURSE PRACTITIONER

## 2020-09-03 PROCEDURE — G8419 CALC BMI OUT NRM PARAM NOF/U: HCPCS | Performed by: NURSE PRACTITIONER

## 2020-09-04 ENCOUNTER — HOSPITAL ENCOUNTER (EMERGENCY)
Age: 35
Discharge: HOME | End: 2020-09-04
Payer: MEDICAID

## 2020-09-04 VITALS
BODY MASS INDEX: 25.06 KG/M2 | OXYGEN SATURATION: 98 % | SYSTOLIC BLOOD PRESSURE: 110 MMHG | HEART RATE: 88 BPM | RESPIRATION RATE: 18 BRPM | DIASTOLIC BLOOD PRESSURE: 64 MMHG | WEIGHT: 146 LBS | TEMPERATURE: 98.2 F

## 2020-09-04 VITALS
TEMPERATURE: 99 F | DIASTOLIC BLOOD PRESSURE: 75 MMHG | BODY MASS INDEX: 24.03 KG/M2 | OXYGEN SATURATION: 100 % | RESPIRATION RATE: 18 BRPM | WEIGHT: 140 LBS | SYSTOLIC BLOOD PRESSURE: 129 MMHG | HEART RATE: 60 BPM

## 2020-09-04 PROCEDURE — 6370000000 HC RX 637 (ALT 250 FOR IP): Performed by: NURSE PRACTITIONER

## 2020-09-04 PROCEDURE — 99282 EMERGENCY DEPT VISIT SF MDM: CPT

## 2020-09-04 RX ORDER — OXYCODONE HYDROCHLORIDE AND ACETAMINOPHEN 5; 325 MG/1; MG/1
1 TABLET ORAL ONCE
Status: COMPLETED | OUTPATIENT
Start: 2020-09-04 | End: 2020-09-04

## 2020-09-04 RX ORDER — IBUPROFEN 600 MG/1
600 TABLET ORAL EVERY 8 HOURS PRN
Qty: 30 TABLET | Refills: 0 | Status: SHIPPED | OUTPATIENT
Start: 2020-09-04 | End: 2020-09-14

## 2020-09-04 RX ORDER — PENICILLIN V POTASSIUM 250 MG/1
500 TABLET ORAL ONCE
Status: COMPLETED | OUTPATIENT
Start: 2020-09-04 | End: 2020-09-04

## 2020-09-04 RX ORDER — OXYCODONE HYDROCHLORIDE AND ACETAMINOPHEN 5; 325 MG/1; MG/1
1 TABLET ORAL EVERY 6 HOURS PRN
Qty: 4 TABLET | Refills: 0 | Status: SHIPPED | OUTPATIENT
Start: 2020-09-04 | End: 2020-09-06

## 2020-09-04 RX ORDER — PENICILLIN V POTASSIUM 500 MG/1
500 TABLET ORAL 4 TIMES DAILY
Qty: 28 TABLET | Refills: 0 | Status: SHIPPED | OUTPATIENT
Start: 2020-09-04 | End: 2020-09-11

## 2020-09-04 RX ADMIN — OXYCODONE HYDROCHLORIDE AND ACETAMINOPHEN 1 TABLET: 5; 325 TABLET ORAL at 17:54

## 2020-09-04 RX ADMIN — PENICILLIN V POTASSIUM 500 MG: 250 TABLET, FILM COATED ORAL at 17:54

## 2020-09-04 ASSESSMENT — ENCOUNTER SYMPTOMS
VOMITING: 0
DIARRHEA: 0
CHEST TIGHTNESS: 0
NAUSEA: 0
CONSTIPATION: 0
SHORTNESS OF BREATH: 0
COUGH: 0

## 2020-09-04 ASSESSMENT — PAIN SCALES - GENERAL
PAINLEVEL_OUTOF10: 6
PAINLEVEL_OUTOF10: 6

## 2020-09-04 NOTE — ED PROVIDER NOTES
**ADVANCED PRACTICE PROVIDER, I HAVE EVALUATED THIS [facility]  ED  EMERGENCY DEPARTMENT ENCOUNTER      Pt Name: Mahendra Harry  YN  [place] 1985  Date of evaluation: 2020  Provider: KATTY Sequeira CNP      Chief Complaint:    Chief Complaint   Patient presents with    Dental Pain     has dentist appt on the , feels like she has an abscessed tooth, started        Nursing Notes, Past Medical Hx, Past Surgical Hx, Social Hx, Allergies, and Family Hx were all reviewed and agreed with or any disagreements were addressed in the HPI.    HPI:  (Location, Duration, Timing, Severity, Quality, Assoc Sx, Context, Modifying factors)  This is a  28 y.o. female who presents with tooth pain for the past 5 days. States she has a dentist appt for . Denies any fevers, chills, nausea, vomiting or trouble swallowing. Rates pain a /10    PastMedical/Surgical History:      Diagnosis Date    Bipolar affect, depressed ([facility] [address]     Depression     GERD (gastroesophageal reflux disease)     IBS (irritable bowel syndrome)          Procedure Laterality Date    CHOLECYSTECTOMY      LEEP N/A 2019    LOOP ELECTROSURGICAL EXCISION PROCEDURE performed by Neo Romero MD at [address]       Medications:  Discharge Medication List as of 2020  5:56 PM      CONTINUE these medications which have NOT CHANGED    Details   promethazine (PHENERGAN) 12.5 MG tablet TAKE ONE TABLET BY MOUTH FOUR TIMES A DAY AS NEEDED FOR NAUSEA, Disp-30 tablet,R-2Normal      omeprazole (PRILOSEC) 40 MG delayed release capsule TAKE ONE CAPSULE BY MOUTH EVERY MORNING BEFORE BREAKFAST, Disp-90 capsule,R-1Normal      acetaminophen (TYLENOL) 500 MG tablet Take 500 mg by mouth every 6 hours as needed for PainHistorical Med               Review of Systems:  Review of Systems   Constitutional: Negative for fever. HENT: Positive for dental problem.     Respiratory: Negative for cough Non-plain film images such as CT, Ultrasound and MRI are read by the radiologist. I, KATTY Justin CNP have directly visualized the radiologic plain film image(s) with the below findings:        Interpretation per the Radiologist below, if available at the time of this note:    No orders to display        No results found. MEDICAL DECISION MAKING / ED COURSE:      PROCEDURES:   Procedures    None    Patient was given:  Medications   oxyCODONE-acetaminophen (PERCOCET) 5-325 MG per tablet 1 tablet (1 tablet Oral Given 9/4/20 1754)   penicillin v potassium (VEETID) tablet 500 mg (500 mg Oral Given 9/4/20 1754)       Patient is alert and oriented x4. Moist mucus membranes. Tooth 6 with dental caries. No palpable abscess noted. No cervical adenopathy present. Heart with RRR. No increased work of breathing noted. Differentials: dental caries, tooth pain, fractured tooth, dental abscess  Percocet and Pen VK given ( patient states she has had percocet before and states she is not allergic)  Diagnosis: dental caries, tooth pain  Patient will be discharged with ibuprofen, percocet and Pen VK  Follow up with dentist. Dental clinic list also given      The patient tolerated their visit well. I evaluated the patient. The physician was available for consultation as needed. The patient and / or the family were informed of the results of any tests, a time was given to answer questions, a plan was proposed and they agreed with plan. CLINICAL IMPRESSION:  1. Pain, dental    2.  Dental caries        DISPOSITION        PATIENT REFERRED TO:  Mahendra Tompkins MD  [place] [id number] [phone]    Schedule an appointment as soon as possible for a visit in 3 days  For recheck    Your dentist    Go on 9/24/2020  scheduled appointment      DISCHARGE MEDICATIONS:  Discharge Medication List as of 9/4/2020  5:56 PM      START taking these medications    Details   ibuprofen (ADVIL;MOTRIN) 600 MG tablet Take 1 tablet by mouth every 8 hours as needed for Pain, Disp-30 tablet,R-0Print      oxyCODONE-acetaminophen (PERCOCET) 5-325 MG per tablet Take 1 tablet by mouth every 6 hours as needed for Pain for up to 2 days. WARNING:  May cause drowsiness. May impair ability to operate vehicles or machinery.   Do not use in combination with alcohol., Disp-4 tablet,R-0Print      penicillin v potassium (VEETID) 500 MG tablet Take 1 tablet by mouth 4 times daily for 7 days, Disp-28 tablet,R-0Print             DISCONTINUED MEDICATIONS:  Discharge Medication List as of 9/4/2020  5:56 PM      STOP taking these medications       hydrOXYzine (ATARAX) 25 MG tablet Comments:   Reason for Stopping:         citalopram (CELEXA) 10 MG tablet Comments:   Reason for Stopping:         sertraline (ZOLOFT) 50 MG tablet Comments:   Reason for Stopping:                      (Please note the MDM and HPI sections of this note were completed with a voice recognition program.  Efforts were made to edit the dictations but occasionally words are mis-transcribed.)    Electronically signed, KATTY Briceño CNP,        KATTY Briceño CNP  09/05/20 [address] KATTY Alejo CNP  09/08/20 [id number]

## 2020-09-08 NOTE — TELEPHONE ENCOUNTER
Refill Request     Last Seen: 9/3/2020    Last Written: 8/12/2020    Next Appointment:   No future appointments.           Requested Prescriptions     Pending Prescriptions Disp Refills    promethazine (PHENERGAN) 12.5 MG tablet [Pharmacy Med Name: PROMETHAZINE 12.5 MG TABLET] 30 tablet 1     Sig: TAKE ONE TABLET BY MOUTH FOUR TIMES A DAY AS NEEDED FOR NAUSEA

## 2020-09-09 RX ORDER — PROMETHAZINE HYDROCHLORIDE 12.5 MG/1
TABLET ORAL
Qty: 30 TABLET | Refills: 1 | Status: SHIPPED | OUTPATIENT
Start: 2020-09-09 | End: 2020-10-19

## 2020-09-14 ENCOUNTER — HOSPITAL ENCOUNTER (EMERGENCY)
Age: 35
Discharge: HOME | End: 2020-09-14
Payer: MEDICAID

## 2020-09-14 VITALS
RESPIRATION RATE: 16 BRPM | OXYGEN SATURATION: 100 % | DIASTOLIC BLOOD PRESSURE: 81 MMHG | BODY MASS INDEX: 23.9 KG/M2 | WEIGHT: 140 LBS | HEIGHT: 64 IN | SYSTOLIC BLOOD PRESSURE: 123 MMHG | TEMPERATURE: 98.4 F | HEART RATE: 60 BPM

## 2020-09-14 LAB — S PYO AG THROAT QL: NEGATIVE

## 2020-09-14 PROCEDURE — 10060 I&D ABSCESS SIMPLE/SINGLE: CPT

## 2020-09-14 PROCEDURE — 87081 CULTURE SCREEN ONLY: CPT

## 2020-09-14 PROCEDURE — 87880 STREP A ASSAY W/OPTIC: CPT

## 2020-09-14 PROCEDURE — 99282 EMERGENCY DEPT VISIT SF MDM: CPT

## 2020-09-14 PROCEDURE — 6370000000 HC RX 637 (ALT 250 FOR IP): Performed by: NURSE PRACTITIONER

## 2020-09-14 RX ORDER — OXYCODONE HYDROCHLORIDE AND ACETAMINOPHEN 5; 325 MG/1; MG/1
1 TABLET ORAL ONCE
Status: COMPLETED | OUTPATIENT
Start: 2020-09-14 | End: 2020-09-14

## 2020-09-14 RX ORDER — CHLORHEXIDINE GLUCONATE 0.12 MG/ML
15 RINSE ORAL 2 TIMES DAILY
Qty: 420 ML | Refills: 0 | Status: SHIPPED | OUTPATIENT
Start: 2020-09-14 | End: 2020-09-28

## 2020-09-14 RX ORDER — CLINDAMYCIN HYDROCHLORIDE 150 MG/1
450 CAPSULE ORAL 3 TIMES DAILY
Qty: 90 CAPSULE | Refills: 0 | Status: SHIPPED | OUTPATIENT
Start: 2020-09-14 | End: 2020-09-24

## 2020-09-14 RX ORDER — NAPROXEN 500 MG/1
500 TABLET ORAL 2 TIMES DAILY PRN
Qty: 10 TABLET | Refills: 0 | Status: SHIPPED | OUTPATIENT
Start: 2020-09-14 | End: 2021-05-28 | Stop reason: ALTCHOICE

## 2020-09-14 RX ORDER — PENICILLIN V POTASSIUM 500 MG/1
500 TABLET ORAL 4 TIMES DAILY
Qty: 40 TABLET | Refills: 0 | Status: SHIPPED | OUTPATIENT
Start: 2020-09-14 | End: 2020-09-14

## 2020-09-14 RX ORDER — KETOROLAC TROMETHAMINE 10 MG/1
10 TABLET, FILM COATED ORAL EVERY 6 HOURS PRN
Qty: 20 TABLET | Refills: 0 | Status: SHIPPED | OUTPATIENT
Start: 2020-09-14 | End: 2020-09-14

## 2020-09-14 RX ADMIN — OXYCODONE HYDROCHLORIDE AND ACETAMINOPHEN 1 TABLET: 5; 325 TABLET ORAL at 19:12

## 2020-09-14 ASSESSMENT — PAIN SCALES - GENERAL
PAINLEVEL_OUTOF10: 7
PAINLEVEL_OUTOF10: 7

## 2020-09-14 ASSESSMENT — PAIN DESCRIPTION - DESCRIPTORS: DESCRIPTORS: STABBING;THROBBING

## 2020-09-14 ASSESSMENT — PAIN DESCRIPTION - ORIENTATION: ORIENTATION: RIGHT

## 2020-09-14 ASSESSMENT — PAIN DESCRIPTION - PAIN TYPE: TYPE: ACUTE PAIN

## 2020-09-14 ASSESSMENT — PAIN DESCRIPTION - LOCATION: LOCATION: OTHER (COMMENT)

## 2020-09-14 NOTE — ED PROVIDER NOTES
[address] [facility] Emergency Department    CHIEF COMPLAINT  Abscess (right upper lip area, noticed it this morning)      SHARED SERVICE VISIT:  Evaluated by BANDAR. My supervising physician was available for consultation. HISTORY OF PRESENT ILLNESS  Mahendra Harry is a 28 y.o. female with overall poor dentition, who presents to the ED complaining of acute onset of maxillary right 1st molar pain with facial edema. She denies sore throat, cough, chills, fevers, nausea, vomiting, diarrhea.  + Diaphoresis at night. No other complaints, modifying factors or associated symptoms. Nursing notes reviewed. Past Medical History:   Diagnosis Date    Bipolar affect, depressed ([address]     Depression     GERD (gastroesophageal reflux disease)     IBS (irritable bowel syndrome)      Past Surgical History:   Procedure Laterality Date    CHOLECYSTECTOMY      LEEP N/A 6/25/2019    LOOP ELECTROSURGICAL EXCISION PROCEDURE performed by Neo Romero MD at [facility]     Family History   Problem Relation Age of Onset    Cancer Mother 48        RCC     Social History     Socioeconomic History    Marital status: Single     Spouse name: Not on file    Number of children: Not on file    Years of education: Not on file    Highest education level: Not on file   Occupational History    Not on file   Social Needs    Financial resource strain: Not on file    Food insecurity     Worry: Not on file     Inability: Not on file    Transportation needs     Medical: Not on file     Non-medical: Not on file   Tobacco Use    Smoking status: Current Every Day Smoker     Packs/day: 0.50     Types: Cigarettes    Smokeless tobacco: Never Used   Substance and Sexual Activity    Alcohol use:  Yes     Alcohol/week: 0.0 - 1.0 standard drinks     Frequency: Monthly or less     Comment: social - one every 2 -3 months    Drug use: No    Sexual activity: Yes     Birth control/protection: Injection   Lifestyle    Physical activity     Days per week: Not on file     Minutes per session: Not on file    Stress: Not on file   Relationships    Social connections     Talks on phone: Not on file     Gets together: Not on file     Attends Adventism service: Not on file     Active member of club or organization: Not on file     Attends meetings of clubs or organizations: Not on file     Relationship status: Not on file    Intimate partner violence     Fear of current or ex partner: Not on file     Emotionally abused: Not on file     Physically abused: Not on file     Forced sexual activity: Not on file   Other Topics Concern    Not on file   Social History Narrative    Not on file     No current facility-administered medications for this encounter. Current Outpatient Medications   Medication Sig Dispense Refill    promethazine (PHENERGAN) 12.5 MG tablet TAKE ONE TABLET BY MOUTH FOUR TIMES A DAY AS NEEDED FOR NAUSEA 30 tablet 1    ibuprofen (ADVIL;MOTRIN) 600 MG tablet Take 1 tablet by mouth every 8 hours as needed for Pain 30 tablet 0    omeprazole (PRILOSEC) 40 MG delayed release capsule TAKE ONE CAPSULE BY MOUTH EVERY MORNING BEFORE BREAKFAST 90 capsule 1    acetaminophen (TYLENOL) 500 MG tablet Take 500 mg by mouth every 6 hours as needed for Pain       Allergies   Allergen Reactions    Effexor [Venlafaxine] Anaphylaxis    Morphine Anaphylaxis    Food      Mckayla cherries       REVIEW OF SYSTEMS  6 systems reviewed, pertinent positives per HPI otherwise noted to be negative    PHYSICAL EXAM  There were no vitals taken for this visit. GENERAL APPEARANCE: Awake and alert. Cooperative. No acute distress. HEAD: Normocephalic. Atraumatic. EYES: PERRL. EOM's grossly intact. ENT: Mucous membranes are moist.  Uvula is midline.  + Right tonsillar exudates with erythema noted. + area of fluctuance noted to buccal surface above right 1st molar. NECK: Supple.  Normal ROM.  + Right anterior chain cervical/submandibular Considered carotid artery dissection, temporal arteritis, sialadentitis, peritonsillar abscess, and temporomandibular joint syndrome but less likely based on history and physical.     Impression:  Dental abscess    DISPOSITION  Patient was discharged to home in good condition.            KATTY Hung - RAYMON  09/15/20 [id number]

## 2020-09-14 NOTE — LETTER
[facility]  [facility]  [address] [address]  Phone: [phone]  Fax: [phone]               September 14, 2020    Patient: Gary Lozano   YOB: 1985   Date of Visit: 9/14/2020       To Whom It May Concern:    Oralia Quiñones was seen and treated in our emergency department on 9/14/2020. She may return to work on 9/16/2020.       Sincerely,       KATTY Jose CNP         Signature:__________________________________

## 2020-09-15 NOTE — TELEPHONE ENCOUNTER
Refill Request     Last Seen: 9/3/2020    Last Written: 2/26/2020    Next Appointment:   No future appointments.         Requested Prescriptions     Pending Prescriptions Disp Refills    omeprazole (PRILOSEC) 40 MG delayed release capsule [Pharmacy Med Name: OMEPRAZOLE DR 40 MG CAPSULE] 30 capsule 0     Sig: TAKE ONE CAPSULE BY MOUTH EVERY MORNING BEFORE BREAKFAST

## 2020-09-16 LAB — S PYO THROAT QL CULT: NORMAL

## 2020-09-17 RX ORDER — OMEPRAZOLE 40 MG/1
CAPSULE, DELAYED RELEASE ORAL
Qty: 90 CAPSULE | Refills: 1 | Status: SHIPPED | OUTPATIENT
Start: 2020-09-17 | End: 2021-04-06

## 2020-10-19 RX ORDER — PROMETHAZINE HYDROCHLORIDE 12.5 MG/1
TABLET ORAL
Qty: 30 TABLET | Refills: 0 | Status: SHIPPED | OUTPATIENT
Start: 2020-10-19 | End: 2020-11-13

## 2020-10-19 NOTE — TELEPHONE ENCOUNTER
Refill Request     Last Seen: 9/3/2020    Last Written: #30  1rf  9/9/2020    Next Appointment:   No future appointments.           Requested Prescriptions     Pending Prescriptions Disp Refills    promethazine (PHENERGAN) 12.5 MG tablet [Pharmacy Med Name: PROMETHAZINE 12.5 MG TABLET] 30 tablet 0     Sig: TAKE ONE TABLET BY MOUTH FOUR TIMES A DAY AS NEEDED FOR NAUSEA

## 2021-03-25 ENCOUNTER — OFFICE VISIT (OUTPATIENT)
Dept: FAMILY MEDICINE CLINIC | Age: 36
End: 2021-03-25
Payer: MEDICAID

## 2021-03-25 VITALS
HEART RATE: 70 BPM | HEIGHT: 64 IN | WEIGHT: 140 LBS | BODY MASS INDEX: 23.9 KG/M2 | RESPIRATION RATE: 16 BRPM | TEMPERATURE: 97.7 F | DIASTOLIC BLOOD PRESSURE: 80 MMHG | SYSTOLIC BLOOD PRESSURE: 120 MMHG | OXYGEN SATURATION: 99 %

## 2021-03-25 DIAGNOSIS — Z13.1 DIABETES MELLITUS SCREENING: ICD-10-CM

## 2021-03-25 DIAGNOSIS — B35.4 TINEA CORPORIS: Primary | ICD-10-CM

## 2021-03-25 PROCEDURE — 99214 OFFICE O/P EST MOD 30 MIN: CPT | Performed by: NURSE PRACTITIONER

## 2021-03-25 PROCEDURE — G8428 CUR MEDS NOT DOCUMENT: HCPCS | Performed by: NURSE PRACTITIONER

## 2021-03-25 PROCEDURE — G8484 FLU IMMUNIZE NO ADMIN: HCPCS | Performed by: NURSE PRACTITIONER

## 2021-03-25 PROCEDURE — G8420 CALC BMI NORM PARAMETERS: HCPCS | Performed by: NURSE PRACTITIONER

## 2021-03-25 PROCEDURE — 4004F PT TOBACCO SCREEN RCVD TLK: CPT | Performed by: NURSE PRACTITIONER

## 2021-03-25 RX ORDER — TERBINAFINE HYDROCHLORIDE 250 MG/1
250 TABLET ORAL DAILY
Qty: 14 TABLET | Refills: 0 | Status: SHIPPED | OUTPATIENT
Start: 2021-03-25 | End: 2021-04-14 | Stop reason: SDUPTHER

## 2021-03-25 ASSESSMENT — ENCOUNTER SYMPTOMS
RESPIRATORY NEGATIVE: 1
GASTROINTESTINAL NEGATIVE: 1

## 2021-03-25 NOTE — PATIENT INSTRUCTIONS
Patient Education        Ringworm: Care Instructions  Your Care Instructions  Ringworm is a fungus infection of the skin. It is not caused by a worm. Ringworm causes a round, scaly rash that may crack and itch. The rash can spread over a wide area. One type of fungus that causes ringworm is often found in locker rooms and swimming pools. It grows well in warm, moist areas of the skin, such as in skin folds. You can get ringworm by sharing towels, clothing, and sports equipment. You can also get it by touching someone who has ringworm. Ringworm is treated with cream that kills the fungus. If the rash is widespread, you may need pills to get rid of it. Ringworm often comes back after treatment. If the rash becomes infected with bacteria, you may need antibiotics. Follow-up care is a key part of your treatment and safety. Be sure to make and go to all appointments, and call your doctor if you are having problems. It's also a good idea to know your test results and keep a list of the medicines you take. How can you care for yourself at home? · Take your medicines exactly as prescribed. Call your doctor if you have any problems with your medicine. · Wash the rash with soap and water, remove flaky skin, and dry thoroughly. · Try an over-the-counter antifungal cream. Spread the cream beyond the edge or border of the rash. Follow the directions on the package. Do not stop using the medicine just because your skin clears up. You will probably need to continue treatment for 2 to 4 weeks or longer. · To avoid spreading it, wash your hands well after treating or touching the rash. · To keep from getting another infection:  ? Do not go barefoot in public places such as gyms or locker rooms. Avoid sharing towels and clothes. Use flip-flops or some other type of shoe in the shower. ? Do not wear tight clothes or let your skin stay damp for long periods, such as by staying in a wet bathing suit or sweaty clothes.   When

## 2021-03-25 NOTE — PROGRESS NOTES
3/25/2021     Vale Hubbard (:  1985) is a 28 y.o. female, here for evaluation of the following medical concerns:    HPI  Pt c/o itchy rash to right upper thigh and groin area. No known cause. Does go to the tanning bed. Pt states that she has a family history of type 1 diabetes and would like to be tested. Review of Systems   Constitutional: Negative. Respiratory: Negative. Cardiovascular: Negative. Gastrointestinal: Negative. Skin: Positive for rash. Negative for wound. Prior to Visit Medications    Medication Sig Taking? Authorizing Provider   terbinafine (LAMISIL) 250 MG tablet Take 1 tablet by mouth daily for 14 days Yes KATTY Andrew CNP   promethazine (PHENERGAN) 12.5 MG tablet TAKE ONE TABLET BY MOUTH FOUR TIMES A DAY AS NEEDED FOR NAUSEA Yes Alex Foster MD   omeprazole (PRILOSEC) 40 MG delayed release capsule TAKE ONE CAPSULE BY MOUTH EVERY Janessa Grew Yes Aelx Foster MD   acetaminophen (TYLENOL) 500 MG tablet Take 500 mg by mouth every 6 hours as needed for Pain Yes Historical Provider, MD   naproxen (NAPROSYN) 500 MG tablet Take 1 tablet by mouth 2 times daily as needed for Pain  KATTY Estrada - CNP        Social History     Tobacco Use    Smoking status: Current Every Day Smoker     Packs/day: 0.50     Types: Cigarettes    Smokeless tobacco: Never Used   Substance Use Topics    Alcohol use: Yes     Alcohol/week: 0.0 - 1.0 standard drinks     Frequency: Monthly or less     Comment: social - one every 2 -3 months        Vitals:    21 1143   BP: 120/80   Pulse: 70   Resp: 16   Temp: 97.7 °F (36.5 °C)   SpO2: 99%   Weight: 140 lb (63.5 kg)   Height: 5' 4\" (1.626 m)     Estimated body mass index is 24.03 kg/m² as calculated from the following:    Height as of this encounter: 5' 4\" (1.626 m). Weight as of this encounter: 140 lb (63.5 kg). Physical Exam  Constitutional:       Appearance: Normal appearance. HENT:      Head: Normocephalic. Neck:      Musculoskeletal: Normal range of motion. Pulmonary:      Effort: Pulmonary effort is normal.   Skin:     General: Skin is warm and dry. Findings: Rash present. Comments: Round annular lesions to right upper inner thigh and groin area. Neurological:      Mental Status: She is alert and oriented to person, place, and time. ASSESSMENT/PLAN:  1. Tinea corporis  Advised pt to make sure that she dries the area completely after showering. Wear loose fitting pants. Sleep without pants on. Tinea corporis is contagious, consider refraining from sexual activity given location. F/u as needed. - terbinafine (LAMISIL) 250 MG tablet; Take 1 tablet by mouth daily for 14 days  Dispense: 14 tablet; Refill: 0    2. Diabetes mellitus screening  Pt stated that she has a family h/o type 1 diabetes and would like to be checked. - HEMOGLOBIN A1C; Future      Return if symptoms worsen or fail to improve. An electronic signature was used to authenticate this note.     --KATTY Andrew - CNP on 3/25/2021 at 12:02 PM

## 2021-03-26 LAB
ESTIMATED AVERAGE GLUCOSE: 102.5 MG/DL
HBA1C MFR BLD: 5.2 %

## 2021-04-06 DIAGNOSIS — K21.9 GASTROESOPHAGEAL REFLUX DISEASE: ICD-10-CM

## 2021-04-06 RX ORDER — OMEPRAZOLE 40 MG/1
CAPSULE, DELAYED RELEASE ORAL
Qty: 30 CAPSULE | Refills: 0 | Status: SHIPPED | OUTPATIENT
Start: 2021-04-06 | End: 2021-05-11

## 2021-04-06 NOTE — TELEPHONE ENCOUNTER
Refill Request     Last Seen: 3/25/21    Last Written: 9/17/20 #90 x 1rf     Next Appointment:   No future appointments.           Requested Prescriptions     Pending Prescriptions Disp Refills    omeprazole (PRILOSEC) 40 MG delayed release capsule [Pharmacy Med Name: OMEPRAZOLE DR 40 MG CAPSULE] 30 capsule 0     Sig: TAKE ONE CAPSULE BY MOUTH EVERY MORNING BEFORE BREAKFAST

## 2021-04-14 DIAGNOSIS — B35.4 TINEA CORPORIS: ICD-10-CM

## 2021-04-14 RX ORDER — TERBINAFINE HYDROCHLORIDE 250 MG/1
250 TABLET ORAL DAILY
Qty: 14 TABLET | Refills: 0 | Status: SHIPPED | OUTPATIENT
Start: 2021-04-14 | End: 2021-04-29 | Stop reason: SDUPTHER

## 2021-04-29 DIAGNOSIS — B35.4 TINEA CORPORIS: ICD-10-CM

## 2021-04-29 RX ORDER — TERBINAFINE HYDROCHLORIDE 250 MG/1
250 TABLET ORAL DAILY
Qty: 14 TABLET | Refills: 0 | Status: SHIPPED | OUTPATIENT
Start: 2021-04-29 | End: 2021-05-13

## 2021-05-07 DIAGNOSIS — K21.9 GASTROESOPHAGEAL REFLUX DISEASE: ICD-10-CM

## 2021-05-10 NOTE — TELEPHONE ENCOUNTER
Refill Request     Last Seen: Last Seen Department: 3/25/2021  Last Seen by PCP: 10/3/2019    Last Written: 1/13/21 with 5 refills    Next Appointment:   No future appointments. When do you want to see pt back?       Requested Prescriptions     Pending Prescriptions Disp Refills    promethazine (PHENERGAN) 12.5 MG tablet [Pharmacy Med Name: PROMETHAZINE 12.5 MG TABLET] 30 tablet 4     Sig: TAKE ONE TABLET BY MOUTH FOUR TIMES A DAY AS NEEDED FOR NAUSEA

## 2021-05-10 NOTE — TELEPHONE ENCOUNTER
Refill Request     Last Seen: Last Seen Department: 3/25/2021  Last Seen by PCP: 3/25/2021    Last Written: 4/6/2021    Next Appointment:   No future appointments.           Requested Prescriptions     Pending Prescriptions Disp Refills    omeprazole (PRILOSEC) 40 MG delayed release capsule [Pharmacy Med Name: OMEPRAZOLE DR 40 MG CAPSULE] 30 capsule 0     Sig: TAKE ONE CAPSULE BY MOUTH EVERY MORNING BEFORE BREAKFAST

## 2021-05-11 RX ORDER — OMEPRAZOLE 40 MG/1
CAPSULE, DELAYED RELEASE ORAL
Qty: 30 CAPSULE | Refills: 0 | Status: SHIPPED | OUTPATIENT
Start: 2021-05-11 | End: 2021-06-09

## 2021-05-11 RX ORDER — PROMETHAZINE HYDROCHLORIDE 12.5 MG/1
TABLET ORAL
Qty: 30 TABLET | Refills: 4 | Status: SHIPPED | OUTPATIENT
Start: 2021-05-11 | End: 2021-09-13

## 2021-05-12 ENCOUNTER — TELEPHONE (OUTPATIENT)
Dept: FAMILY MEDICINE CLINIC | Age: 36
End: 2021-05-12

## 2021-05-12 DIAGNOSIS — B35.4 TINEA CORPORIS: Primary | ICD-10-CM

## 2021-05-12 RX ORDER — FLUCONAZOLE 150 MG/1
TABLET ORAL
Qty: 4 TABLET | Refills: 0 | Status: SHIPPED | OUTPATIENT
Start: 2021-05-12 | End: 2021-05-28 | Stop reason: ALTCHOICE

## 2021-05-12 NOTE — TELEPHONE ENCOUNTER
The typical treatment with Lamisil is for 2 weeks. If she still has the rash, I would recommend trying Diflucan 150 mg once a week for 2-4 weeks. I sent the prescription to her pharmacy. I have also placed a referral for dermatology if the rash does not improve after 1-2 doses of the Diflucan. Please provide her with info to schedule.   Thank you

## 2021-05-19 ENCOUNTER — NURSE TRIAGE (OUTPATIENT)
Dept: OTHER | Facility: CLINIC | Age: 36
End: 2021-05-19

## 2021-05-19 NOTE — TELEPHONE ENCOUNTER
normal activities or awakens from sleep, tender to touch     - SEVERE (8-10): excruciating pain, doubled over, unable to do any normal activities       Pt states pain 7-8/10 when she has an episode (intermittent)    7. RECURRENT SYMPTOM: \"Have you ever had this type of abdominal pain before? \" If so, ask: \"When was the last time? \" and \"What happened that time? \"       Pt states the physician years ago was about to diagnose her with IBS    8. CAUSE: \"What do you think is causing the abdominal pain? \"      Pt states IBS    9. RELIEVING/AGGRAVATING FACTORS: \"What makes it better or worse? \" (e.g., movement, antacids, bowel movement)      Pt states eating makes it worse    10. OTHER SYMPTOMS: \"Has there been any vomiting, diarrhea, constipation, or urine problems? \"        N/V; yellow BM at times; diarrhea    11. PREGNANCY: \"Is there any chance you are pregnant? \" \"When was your last menstrual period? \"        Pt denies; LMP pt on depo shot    Protocols used: ABDOMINAL PAIN - FEMALE-ADULT-[place]

## 2021-05-28 ENCOUNTER — OFFICE VISIT (OUTPATIENT)
Dept: FAMILY MEDICINE CLINIC | Age: 36
End: 2021-05-28
Payer: MEDICAID

## 2021-05-28 VITALS
DIASTOLIC BLOOD PRESSURE: 80 MMHG | OXYGEN SATURATION: 98 % | HEART RATE: 84 BPM | SYSTOLIC BLOOD PRESSURE: 120 MMHG | BODY MASS INDEX: 31.34 KG/M2 | WEIGHT: 182.6 LBS

## 2021-05-28 DIAGNOSIS — R10.821 RIGHT UPPER QUADRANT ABDOMINAL TENDERNESS WITH REBOUND TENDERNESS: ICD-10-CM

## 2021-05-28 DIAGNOSIS — K59.1 FUNCTIONAL DIARRHEA: ICD-10-CM

## 2021-05-28 DIAGNOSIS — R10.30 LOWER ABDOMINAL PAIN: ICD-10-CM

## 2021-05-28 DIAGNOSIS — R10.30 LOWER ABDOMINAL PAIN: Primary | ICD-10-CM

## 2021-05-28 DIAGNOSIS — L30.9 DERMATITIS: ICD-10-CM

## 2021-05-28 DIAGNOSIS — R11.2 NON-INTRACTABLE VOMITING WITH NAUSEA, UNSPECIFIED VOMITING TYPE: ICD-10-CM

## 2021-05-28 LAB
A/G RATIO: 1.6 (ref 1.1–2.2)
ALBUMIN SERPL-MCNC: 4.5 G/DL (ref 3.4–5)
ALP BLD-CCNC: 89 U/L (ref 40–129)
ALT SERPL-CCNC: 8 U/L (ref 10–40)
ANION GAP SERPL CALCULATED.3IONS-SCNC: 14 MMOL/L (ref 3–16)
AST SERPL-CCNC: 11 U/L (ref 15–37)
BASOPHILS ABSOLUTE: 0 K/UL (ref 0–0.2)
BASOPHILS RELATIVE PERCENT: 0.3 %
BILIRUB SERPL-MCNC: <0.2 MG/DL (ref 0–1)
BUN BLDV-MCNC: 8 MG/DL (ref 7–20)
C-REACTIVE PROTEIN: 6.8 MG/L (ref 0–5.1)
CALCIUM SERPL-MCNC: 9.4 MG/DL (ref 8.3–10.6)
CHLORIDE BLD-SCNC: 104 MMOL/L (ref 99–110)
CO2: 21 MMOL/L (ref 21–32)
CREAT SERPL-MCNC: 0.6 MG/DL (ref 0.6–1.1)
EOSINOPHILS ABSOLUTE: 0.1 K/UL (ref 0–0.6)
EOSINOPHILS RELATIVE PERCENT: 1.2 %
GFR AFRICAN AMERICAN: >60
GFR NON-AFRICAN AMERICAN: >60
GLOBULIN: 2.9 G/DL
GLUCOSE BLD-MCNC: 83 MG/DL (ref 70–99)
HCT VFR BLD CALC: 38.7 % (ref 36–48)
HEMOGLOBIN: 13.4 G/DL (ref 12–16)
LIPASE: 16 U/L (ref 13–60)
LYMPHOCYTES ABSOLUTE: 2 K/UL (ref 1–5.1)
LYMPHOCYTES RELATIVE PERCENT: 22.3 %
MCH RBC QN AUTO: 33 PG (ref 26–34)
MCHC RBC AUTO-ENTMCNC: 34.6 G/DL (ref 31–36)
MCV RBC AUTO: 95.6 FL (ref 80–100)
MONOCYTES ABSOLUTE: 0.6 K/UL (ref 0–1.3)
MONOCYTES RELATIVE PERCENT: 6.5 %
NEUTROPHILS ABSOLUTE: 6.4 K/UL (ref 1.7–7.7)
NEUTROPHILS RELATIVE PERCENT: 69.7 %
PDW BLD-RTO: 13.6 % (ref 12.4–15.4)
PLATELET # BLD: 229 K/UL (ref 135–450)
PMV BLD AUTO: 8.7 FL (ref 5–10.5)
POTASSIUM SERPL-SCNC: 4.2 MMOL/L (ref 3.5–5.1)
RBC # BLD: 4.05 M/UL (ref 4–5.2)
SEDIMENTATION RATE, ERYTHROCYTE: 25 MM/HR (ref 0–20)
SODIUM BLD-SCNC: 139 MMOL/L (ref 136–145)
TOTAL PROTEIN: 7.4 G/DL (ref 6.4–8.2)
TSH REFLEX: 1 UIU/ML (ref 0.27–4.2)
WBC # BLD: 9.2 K/UL (ref 4–11)

## 2021-05-28 PROCEDURE — G8417 CALC BMI ABV UP PARAM F/U: HCPCS | Performed by: FAMILY MEDICINE

## 2021-05-28 PROCEDURE — 99214 OFFICE O/P EST MOD 30 MIN: CPT | Performed by: FAMILY MEDICINE

## 2021-05-28 PROCEDURE — G8427 DOCREV CUR MEDS BY ELIG CLIN: HCPCS | Performed by: FAMILY MEDICINE

## 2021-05-28 PROCEDURE — 4004F PT TOBACCO SCREEN RCVD TLK: CPT | Performed by: FAMILY MEDICINE

## 2021-05-28 RX ORDER — FAMOTIDINE 20 MG/1
20 TABLET, FILM COATED ORAL NIGHTLY PRN
Qty: 30 TABLET | Refills: 1 | Status: SHIPPED | OUTPATIENT
Start: 2021-05-28 | End: 2021-08-02

## 2021-05-28 RX ORDER — DICYCLOMINE HYDROCHLORIDE 10 MG/1
10 CAPSULE ORAL
Qty: 120 CAPSULE | Refills: 0 | Status: SHIPPED | OUTPATIENT
Start: 2021-05-28 | End: 2021-08-04

## 2021-05-28 RX ORDER — CLOTRIMAZOLE AND BETAMETHASONE DIPROPIONATE 10; .64 MG/G; MG/G
CREAM TOPICAL
Qty: 1 TUBE | Refills: 1 | Status: SHIPPED | OUTPATIENT
Start: 2021-05-28 | End: 2021-08-13

## 2021-05-28 ASSESSMENT — PATIENT HEALTH QUESTIONNAIRE - PHQ9
1. LITTLE INTEREST OR PLEASURE IN DOING THINGS: 0
SUM OF ALL RESPONSES TO PHQ QUESTIONS 1-9: 0
2. FEELING DOWN, DEPRESSED OR HOPELESS: 0
SUM OF ALL RESPONSES TO PHQ QUESTIONS 1-9: 0
SUM OF ALL RESPONSES TO PHQ9 QUESTIONS 1 & 2: 0

## 2021-05-28 ASSESSMENT — ENCOUNTER SYMPTOMS
BLOOD IN STOOL: 0
DIARRHEA: 1
COUGH: 0
VOMITING: 1
ABDOMINAL PAIN: 1
ABDOMINAL DISTENTION: 0
TROUBLE SWALLOWING: 0
NAUSEA: 1
BACK PAIN: 0
COLOR CHANGE: 0
SHORTNESS OF BREATH: 0
CONSTIPATION: 1
ANAL BLEEDING: 0

## 2021-05-28 NOTE — PROGRESS NOTES
Reactions    Effexor [Venlafaxine] Anaphylaxis    Morphine Anaphylaxis    Food      Mckayla cherries       Review of Systems   Constitutional: Positive for fatigue. Negative for activity change, appetite change, chills, diaphoresis, fever and unexpected weight change. HENT: Negative for ear pain, hearing loss and trouble swallowing. Eyes: Negative for visual disturbance. Respiratory: Negative for cough and shortness of breath. Cardiovascular: Negative for chest pain, palpitations and leg swelling. Gastrointestinal: Positive for abdominal pain, constipation, diarrhea, nausea and vomiting. Negative for abdominal distention, anal bleeding and blood in stool. Genitourinary: Negative for decreased urine volume, difficulty urinating, dysuria, flank pain, hematuria and urgency. Musculoskeletal: Negative for arthralgias and back pain. Skin: Positive for rash. Negative for color change, pallor and wound. Neurological: Negative for dizziness, weakness, light-headedness, numbness and headaches. Psychiatric/Behavioral: Positive for sleep disturbance. Negative for dysphoric mood. The patient is nervous/anxious. /80 (Site: Left Upper Arm, Position: Sitting, Cuff Size: Medium Adult)   Pulse 84   Wt 182 lb 9.6 oz (82.8 kg)   SpO2 98%   BMI 31.34 kg/m²     Physical Exam  Vitals and nursing note reviewed. Constitutional:       General: She is not in acute distress. Appearance: She is well-developed. She is not diaphoretic. HENT:      Head: Normocephalic and atraumatic. Eyes:      Pupils: Pupils are equal, round, and reactive to light. Cardiovascular:      Rate and Rhythm: Normal rate and regular rhythm. Pulmonary:      Effort: Pulmonary effort is normal. No respiratory distress. Breath sounds: Normal breath sounds. Abdominal:      General: Bowel sounds are normal. There is no distension. Palpations: Abdomen is soft. There is no mass. Tenderness:  There is abdominal tenderness (RUQ). There is no guarding or rebound. Musculoskeletal:         General: Normal range of motion. Cervical back: Normal range of motion and neck supple. Skin:     General: Skin is warm and dry. Capillary Refill: Capillary refill takes 2 to 3 seconds. Coloration: Skin is not pale. Findings: No erythema or rash. Neurological:      Mental Status: She is alert. Psychiatric:         Behavior: Behavior normal.         Thought Content: Thought content normal.         Judgment: Judgment normal.         Plan  1. Lower abdominal pain  Denies urinary or vaginal complaints  Appears to be IBS in nature based on history, but will send to GI to consider scopes   Trial Bentyl, may consider Elavil if testing is reassuring     - Nito Lopez MD, Gastroenterology, [place]  - famotidine (PEPCID) 20 MG tablet; Take 1 tablet by mouth nightly as needed (reflux)  Dispense: 30 tablet; Refill: 1  - dicyclomine (BENTYL) 10 MG capsule; Take 1 capsule by mouth 4 times daily (before meals and nightly)  Dispense: 120 capsule; Refill: 0  - Hemoglobin A1C; Future  - CBC Auto Differential; Future  - Comprehensive Metabolic Panel; Future  - TSH with Reflex; Future  - MATTHEW; Future  - C-Reactive Protein; Future  - Sedimentation Rate; Future  - LIPASE; Future    2. Functional diarrhea  Encouraged increased hydration until seen by GI  Doesn't appear infectious at this time    - Nito Lopez MD, Gastroenterology, [place]  - famotidine (PEPCID) 20 MG tablet; Take 1 tablet by mouth nightly as needed (reflux)  Dispense: 30 tablet; Refill: 1  - dicyclomine (BENTYL) 10 MG capsule; Take 1 capsule by mouth 4 times daily (before meals and nightly)  Dispense: 120 capsule; Refill: 0  - Hemoglobin A1C; Future  - CBC Auto Differential; Future  - Comprehensive Metabolic Panel; Future  - TSH with Reflex; Future  - MATTHEW; Future  - C-Reactive Protein; Future  - Sedimentation Rate;  Future  - LIPASE; Future    3. Non-intractable vomiting with nausea, unspecified vomiting type  C/w prilosec AM, trial Pepcid in the PM  Discussed GERD treatments, including avoiding eating/ snacking 3-4 hours prior to bedtime.     - Nitin Peace MD, Gastroenterology, [facility]  - famotidine (PEPCID) 20 MG tablet; Take 1 tablet by mouth nightly as needed (reflux)  Dispense: 30 tablet; Refill: 1  - dicyclomine (BENTYL) 10 MG capsule; Take 1 capsule by mouth 4 times daily (before meals and nightly)  Dispense: 120 capsule; Refill: 0  - Hemoglobin A1C; Future  - CBC Auto Differential; Future  - Comprehensive Metabolic Panel; Future  - TSH with Reflex; Future  - MATTHEW; Future  - C-Reactive Protein; Future  - Sedimentation Rate; Future  - LIPASE; Future    4. Dermatitis  - External Referral To Dermatology  - clotrimazole-betamethasone (LOTRISONE) 1-0.05 % cream; Apply topically 2 times daily. Dispense: 1 Tube; Refill: 1    5. Right upper quadrant abdominal tenderness with rebound tenderness  - US GALLBLADDER RUQ; Future        While assessing care for this patient, I have reviewed all pertinent lab work/imaging/ specialist notes and care in reference to those problems addressed above in detail. Appropriate medical decision making was based on this. Please note that portions of this note may have been completed with a voice recognition program. Efforts were made to edit the dictations but occasionally words are mis-transcribed. Return if symptoms worsen or fail to improve.

## 2021-05-29 LAB
ANTI-NUCLEAR ANTIBODY (ANA): NEGATIVE
ESTIMATED AVERAGE GLUCOSE: 96.8 MG/DL
HBA1C MFR BLD: 5 %

## 2021-06-07 ENCOUNTER — HOSPITAL ENCOUNTER (OUTPATIENT)
Dept: ULTRASOUND IMAGING | Age: 36
Discharge: HOME | End: 2021-06-07
Payer: MEDICAID

## 2021-06-07 DIAGNOSIS — R10.821 RIGHT UPPER QUADRANT ABDOMINAL TENDERNESS WITH REBOUND TENDERNESS: ICD-10-CM

## 2021-06-07 PROCEDURE — 76705 ECHO EXAM OF ABDOMEN: CPT

## 2021-06-09 ENCOUNTER — TELEPHONE (OUTPATIENT)
Dept: FAMILY MEDICINE CLINIC | Age: 36
End: 2021-06-09

## 2021-07-01 NOTE — PROGRESS NOTES
[facility], [place] [address]. [address] [address]  Phone: [phone]   Fax:     [phone]        Physical Therapy Treatment Note/ Progress Report:       Date:  2021    Patient Name:  Princess Grover    :  1963  MRN: [id number]    Pertinent Medical History:     Medical/Treatment Diagnosis Information:  · Diagnosis: Left Shoulder pain, unspecified chronicity M25.512       Insurance/Certification information:  PT Insurance Information: [facility], allowed 20 visits per calendar year, does not require prior authorization  Physician Information:  Referring Practitioner: Tito Abrams. Kilo Connor MD  Plan of care signed (Y/N): Cosign requested    Date of Patient follow up with Physician:      Progress Report: []  Yes  [x]  No     Date Range for reporting period:  Beginning:    Ending:      Progress report due (10 Rx/or 30 days whichever is less):      Recertification due (POC duration/ or 90 days whichever is less):      Visit # POC/Insurance Allowable Auth Needed   5  []Yes    [x]No       History of Injury:Patient reports a history of left shoulder pain since 2020, given an injection by , minimal improvement. Pain continued, sometimes worse, went back to Dr. Kiet Flores, referred to Dr. Kilo Connor. \"If I  my granddaughter (3year old) it hurts for a few days. \" Patient sees granddaughter frequently.      MRI left shoulder dated 2021 showed      1. Impingement related change in the posterosuperior humerus with overlying infraspinatus    coalescent tendinopathy. 2. Ruptured intraarticular biceps long head.         Patient saw Dr. Kilo Connor, injected shoulder, referred to PT to try to avoid surgery. Patient reports shoulder is feeling a little better since injection by Dr. Kilo Connor.   Currently pain is intermittent, movement and activity related - lifting, reaching, bra off and on, painful to reach Obstructive Sleep Apnea (SHRUTI) Screening     Patient:  Nasim Rodriguez    YOB: 1985      Medical Record #:  [id number]                     Date:  6/21/2019     1. Are you a loud and/or regular snorer? []  Yes       [x] No    2. Have you been observed to gasp or stop breathing during sleep? []  Yes       [x] No    3. Do you feel tired or groggy upon awakening or do you awaken with a headache?           []  Yes       [] No    4. Are you often tired or fatigued during the wake time hours? []  Yes       [] No    5. Do you fall asleep sitting, reading, watching TV or driving? []  Yes       [] No    6. Do you often have problems with memory or concentration? []  Yes       [] No    **If patient's score is ? 3 they are considered high risk for SHRUTI. Notify the anesthesiologist of the high risk and document in focus note. Note:  If the patient's BMI is more than 35 kg m¯² , has neck circumference > 40 cm, and/or high blood pressure the risk is greater (© American Sleep Apnea Association, 2006). behind back, sometimes pulling pants up, reaching into wall cabinet, moving wet laundry, pulling car door shut, putting on seat belt if uses L UE, sleeping feels better to sleep on L UE, occasional sharp pop when moving it. Patient reports L hip and knee pain, L PSIS     Relevant Medical History: HBP, cancer cervical 2018 hysterectomy, anxiety, covid-19 (January 1, 2021)      Functional Scale/Score: QuickDASH:  34 = 52.27% Disability (6/15/21)     Pain Scale:0-4 /10 at rest, 9-10/10 with activity      SUBJECTIVE:    6/23/21:  Patient reports she has been avoiding movements that hurt, has had some pain with pulling pants up, lifting 50 pound bag, etc.   6/25/21:  Patient reports arm was doing well, however she went to son's house to see new granddaughter, and while holding granddaughter, and 140 pound dog moved towards L arm 3 x pushing arm back. Patient reports shoulder was very sore the rest of the evening and this arm.  6/29/21:  Patient reports shoulder got sore when hanging on inner tube in pool or side of pool. 7/1/21:  Patient reports shoulder is doing \"a little better\".     OBJECTIVE:    Observation: tenderness noted along long and short heads of biceps and prox 1/3 of biceps; small \"click\" or \"pop\" noted with PROM at 90 degrees of flex x 1 rep, then at 110 degrees of flex x 1 rep   Test measurements:      RESTRICTIONS/PRECAUTIONS: rupture of long head of biceps    Exercises/Interventions:   Therapeutic Ex (14686)  Min:15 Resistance/Repetitions CUES/Notes   Home Ex Program  See below   UE [place] Flexion, 1 x 10 reps standing   Gym Ball on Table Flexion, 1 x 10 reps,  Scaption, 1 x 10 reps standing   Pulleys Flexion, 1 x 10 reps sitting   NMR re-education (94933)  Min: 10     Theraslide Lat Pull down, 1 x 10 reps, yellow  Row, 1 x 10 reps, yellow  Extension, 1 x 10 reps,yellow standing        Therapeutic Activity (61088)  Min:10     Patient education  See below        Manual Intervention  ([address] [facility])  Min:20 Soft tissue mob L upper quarter    Joint Mob Inf, post glides, Grade 2    Manual stretch All motions x 5 reps    Arm pull L UE    Modalities:  Min     Kinesiotape Rotator cuff support \"I\" and \"V\", 2.5 squares     1/2 strip x 2.5 squares \"I\" along biceps      Other Therapeutic Activities:  Pt was educated on PT POC, Diagnosis, Prognosis, pathomechanics as well as frequency and duration of scheduling future physical therapy appointments. Time was also taken on this day to answer all patient questions and participation in PT. Reviewed appointment policy in detail with patient and patient verbalized understanding. Discussed at length anatomy and biomechanics of the shoulder, muscle reeducation, motor recruitment. Home Exercise Program:  Patient instructed in lower trap sets, Codman's pendulum, wall slide with step flex, wall slide with step scaption; written instructions with pictures issued, patient able to demonstrate exercises. 7/1/21:   Patient instructed in isometric shoulder flex, ext, abd, add, IR, ER ; written instructions with pictures issued, patient able to demonstrate exercises.       Therapeutic Exercise and NMR EXR  [x] (20247) Provided verbal/tactile cueing for activities related to strengthening, flexibility, endurance, ROM  for improvements in scapular, scapulothoracic and UE control with self care, reaching, carrying, lifting, house/yardwork, driving/computer work.    [] (61783) Provided verbal/tactile cueing for activities related to improving balance, coordination, kinesthetic sense, posture, motor skill, proprioception  to assist with  scapular, scapulothoracic and UE control with self care, reaching, carrying, lifting, house/yardwork, driving/computer work.     Therapeutic Activities:    [x] (56596 or 96205) Provided verbal/tactile cueing for activities related to improving balance, coordination, kinesthetic sense, posture, motor skill, proprioception and motor activation to allow for proper function of scapular, scapulothoracic and UE control with self care, carrying, lifting, driving/computer work. Home Exercise Program:    [x] (31310) Reviewed/Progressed HEP activities related to strengthening, flexibility, endurance, ROM of scapular, scapulothoracic and UE control with self care, reaching, carrying, lifting, house/yardwork, driving/computer work  [] (40129) Reviewed/Progressed HEP activities related to improving balance, coordination, kinesthetic sense, posture, motor skill, proprioception of scapular, scapulothoracic and UE control with self care, reaching, carrying, lifting, house/yardwork, driving/computer work      Manual Treatments:  PROM / STM / Oscillations-Mobs:  G-I, II, III, IV (PA's, Inf., Post.)  [x] (65646) Provided manual therapy to mobilize soft tissue/joints of cervical/CT, scapular GHJ and UE for the purpose of modulating pain, promoting relaxation,  increasing ROM, reducing/eliminating soft tissue swelling/inflammation/restriction, improving soft tissue extensibility and allowing for proper ROM for normal function with self care, reaching, carrying, lifting, house/yardwork, driving/computer work    Charges:  Timed Code Treatment Minutes: 45   Total Treatment Minutes: 45       [] EVAL (LOW) 31305 (typically 20 minutes face-to-face)  [] EVAL (MOD) 40488 (typically 30 minutes face-to-face)  [] EVAL (HIGH) 65016 (typically 45 minutes face-to-face)  [] RE-EVAL     [x] GD(14501) x     [] Dry needle 1 or 2 Muscles (49984)  [x] NMR (94742) x     [] Dry needle 3+ Muscles (45593)  [x] Manual (99038) x    [] Ultrasound (64616) x  [] TA (69920) x     [] Mech Traction (50236)  [] ES(attended) (53603)     [] ES (un) (06578):   [] Vasopump (65038) [] Ionto (42935)   [] Other:          GOALS:  Patient stated goal: \"moving\"  []? Progressing: []? Met: []? Not Met: []? Adjusted     Therapist goals for Patient:   Short Term Goals: To be achieved in: 2-4 weeks  1.  Independent in HEP and progression per patient tolerance, in order to prevent re-injury. []? Progressing: []? Met: []? Not Met: []? Adjusted  2. Patient will have a decrease in pain to facilitate improvement in movement, function, and ADLs as indicated by Functional Deficits. []? Progressing: []? Met: []? Not Met: []? Adjusted     Long Term Goals: To be achieved in at discharge:  1. Disability index score of 20% or less for the Quick DASH to assist with reaching prior level of function. []? Progressing: []? Met: []? Not Met: []? Adjusted  2. Patient will demonstrate increased AROM to WNL to allow for proper joint functioning as indicated by Functional Deficits. []? Progressing: []? Met: []? Not Met: []? Adjusted  3. Patient will demonstrate an increase in NM recruitment/activation and overall GH and scapular strength to within WNL for proper functional mobility as indicated by patients Functional Deficits. []? Progressing: []? Met: []? Not Met: []? Adjusted  4. Patient will return to ADLs/ chores/work activities without increased symptoms or restriction. []? Progressing: []? Met: []? Not Met: []? Adjusted       ASSESSMENT:  Patient presents with decreased functional mobility consistent with biceps tear and decreased control of L upper quarter            Treatment/Activity Tolerance:  [x] Patient tolerated treatment well [] Patient limited by fatique  [] Patient limited by pain  [] Patient limited by other medical complications  [] Other:     Overall Progression Towards Functional goals/ Treatment Progress Update:  [] Patient is progressing as expected towards functional goals listed. [] Progression is slowed due to complexities/Impairments listed. [] Progression has been slowed due to co-morbidities.   [x] Plan just implemented, too soon to assess goals progression <30days   [] Goals require adjustment due to lack of progress  [] Patient is not progressing as expected and requires additional follow up with physician  [] Other    Prognosis for POC: [x] Good [] Fair  [] Poor    Patient requires continued skilled intervention: [x] Yes  [] No      PLAN: Monitor response. Initiate IR/ER on therslide in clinic; 1 pound bicep curls if able and painfree. [x] Continue per plan of care [] Alter current plan (see comments)  [] Plan of care initiated [] Hold pending MD visit [] Discharge    Electronically signed by: Ana M Stanley, PT [id number]    Note: If patient does not return for scheduled/recommended follow up visits, this note will serve as a discharge from care along with the most recent update on progress.

## 2021-07-30 ENCOUNTER — APPOINTMENT (OUTPATIENT)
Dept: GENERAL RADIOLOGY | Age: 36
End: 2021-07-30
Payer: MEDICAID

## 2021-07-30 ENCOUNTER — HOSPITAL ENCOUNTER (EMERGENCY)
Age: 36
Discharge: HOME | End: 2021-07-30
Payer: MEDICAID

## 2021-07-30 VITALS
HEART RATE: 82 BPM | WEIGHT: 175 LBS | RESPIRATION RATE: 12 BRPM | DIASTOLIC BLOOD PRESSURE: 93 MMHG | TEMPERATURE: 98.2 F | OXYGEN SATURATION: 100 % | SYSTOLIC BLOOD PRESSURE: 122 MMHG | BODY MASS INDEX: 31.01 KG/M2 | HEIGHT: 63 IN

## 2021-07-30 DIAGNOSIS — S92.502A CLOSED FRACTURE OF PHALANX OF LEFT THIRD TOE, INITIAL ENCOUNTER: Primary | ICD-10-CM

## 2021-07-30 PROCEDURE — 99284 EMERGENCY DEPT VISIT MOD MDM: CPT

## 2021-07-30 PROCEDURE — 73630 X-RAY EXAM OF FOOT: CPT

## 2021-07-30 PROCEDURE — 6370000000 HC RX 637 (ALT 250 FOR IP): Performed by: NURSE PRACTITIONER

## 2021-07-30 RX ORDER — IBUPROFEN 600 MG/1
600 TABLET ORAL 3 TIMES DAILY PRN
Qty: 15 TABLET | Refills: 0 | Status: SHIPPED | OUTPATIENT
Start: 2021-07-30 | End: 2021-08-13

## 2021-07-30 RX ORDER — ACETAMINOPHEN 500 MG
500 TABLET ORAL 4 TIMES DAILY PRN
Qty: 360 TABLET | Refills: 1 | Status: SHIPPED | OUTPATIENT
Start: 2021-07-30

## 2021-07-30 RX ORDER — ACETAMINOPHEN 325 MG/1
650 TABLET ORAL ONCE
Status: COMPLETED | OUTPATIENT
Start: 2021-07-30 | End: 2021-07-30

## 2021-07-30 RX ORDER — IBUPROFEN 800 MG/1
800 TABLET ORAL ONCE
Status: COMPLETED | OUTPATIENT
Start: 2021-07-30 | End: 2021-07-30

## 2021-07-30 RX ADMIN — ACETAMINOPHEN 650 MG: 325 TABLET ORAL at 14:50

## 2021-07-30 RX ADMIN — IBUPROFEN 800 MG: 800 TABLET, FILM COATED ORAL at 14:50

## 2021-07-30 ASSESSMENT — PAIN DESCRIPTION - PAIN TYPE: TYPE: ACUTE PAIN

## 2021-07-30 ASSESSMENT — PAIN DESCRIPTION - ORIENTATION: ORIENTATION: LEFT

## 2021-07-30 ASSESSMENT — PAIN DESCRIPTION - DESCRIPTORS: DESCRIPTORS: ACHING

## 2021-07-30 ASSESSMENT — PAIN DESCRIPTION - FREQUENCY: FREQUENCY: CONTINUOUS

## 2021-07-30 ASSESSMENT — PAIN SCALES - GENERAL
PAINLEVEL_OUTOF10: 8
PAINLEVEL_OUTOF10: 6
PAINLEVEL_OUTOF10: 8

## 2021-07-30 ASSESSMENT — PAIN DESCRIPTION - LOCATION: LOCATION: TOE (COMMENT WHICH ONE)

## 2021-07-30 NOTE — ED NOTES
Placed chino tape on pt's left 3rd and 4th toes per NP's instructions. Placed post-op shoe on pt's left foot. Pt understood application and removal instructions and did not have any additional questions.     Pt was given a roll of chino tape to take home     Parvez Stinson  07/30/21 6310

## 2021-08-04 ENCOUNTER — TELEPHONE (OUTPATIENT)
Dept: GASTROENTEROLOGY | Age: 36
End: 2021-08-04

## 2021-08-04 ENCOUNTER — INITIAL CONSULT (OUTPATIENT)
Dept: GASTROENTEROLOGY | Age: 36
End: 2021-08-04
Payer: MEDICAID

## 2021-08-04 VITALS
WEIGHT: 181.2 LBS | DIASTOLIC BLOOD PRESSURE: 78 MMHG | HEIGHT: 63 IN | SYSTOLIC BLOOD PRESSURE: 126 MMHG | HEART RATE: 82 BPM | BODY MASS INDEX: 32.11 KG/M2

## 2021-08-04 DIAGNOSIS — R11.2 NAUSEA AND VOMITING, INTRACTABILITY OF VOMITING NOT SPECIFIED, UNSPECIFIED VOMITING TYPE: Primary | ICD-10-CM

## 2021-08-04 DIAGNOSIS — R19.7 DIARRHEA, UNSPECIFIED TYPE: ICD-10-CM

## 2021-08-04 PROCEDURE — G8427 DOCREV CUR MEDS BY ELIG CLIN: HCPCS | Performed by: INTERNAL MEDICINE

## 2021-08-04 PROCEDURE — G8417 CALC BMI ABV UP PARAM F/U: HCPCS | Performed by: INTERNAL MEDICINE

## 2021-08-04 PROCEDURE — 99214 OFFICE O/P EST MOD 30 MIN: CPT | Performed by: INTERNAL MEDICINE

## 2021-08-04 PROCEDURE — 4004F PT TOBACCO SCREEN RCVD TLK: CPT | Performed by: INTERNAL MEDICINE

## 2021-08-04 NOTE — ED PROVIDER NOTES
[facility] Emergency Department    CHIEF COMPLAINT  Toe Pain (patient has a piece of frozen meat roll out of refrigerator onto 2nd and middle toes on left foot. )      SHARED SERVICE VISIT:  Evaluated by BANDAR. My supervising physician was available for consultation. HISTORY OF PRESENT ILLNESS  Danisha Sparrow is a 28 y.o. nontoxic, well-appearing, mildly distressed female who presents to the ED complaining of \"aching and stabbing\" 7/10-second and third and left toe pain status post at 1400 hrs. today she opened her freezer and accidentally dropped a frozen hamburger log onto the foot. She is able to ambulate but with an antalgic gait. Denies ankle/foot pain or other injury/concerns. No other complaints, modifying factors or associated symptoms. Nursing notes reviewed. Past Medical History:   Diagnosis Date    Bipolar affect, depressed ([address]     Depression     GERD (gastroesophageal reflux disease)     IBS (irritable bowel syndrome)      Past Surgical History:   Procedure Laterality Date    CHOLECYSTECTOMY      LEEP N/A 6/25/2019    LOOP ELECTROSURGICAL EXCISION PROCEDURE performed by Yomaira Noyola MD at [facility]     Family History   Problem Relation Age of Onset    Cancer Mother 48        RCC     Social History     Socioeconomic History    Marital status: Single     Spouse name: Not on file    Number of children: Not on file    Years of education: Not on file    Highest education level: Not on file   Occupational History    Not on file   Tobacco Use    Smoking status: Current Every Day Smoker     Packs/day: 0.50     Types: Cigarettes    Smokeless tobacco: Never Used   Vaping Use    Vaping Use: Never used   Substance and Sexual Activity    Alcohol use:  Yes     Alcohol/week: 0.0 - 1.0 standard drinks     Comment: social - one every 2 -3 months    Drug use: No    Sexual activity: Yes     Birth control/protection: Injection   Other Topics Concern    Not on file   Social History Narrative    Not on file     Social Determinants of Health     Financial Resource Strain:     Difficulty of Paying Living Expenses:    Food Insecurity:     Worried About Running Out of Food in the Last Year:     [address] in the Last Year:    Transportation Needs:     Lack of Transportation (Medical):  Lack of Transportation (Non-Medical):    Physical Activity:     Days of Exercise per Week:     Minutes of Exercise per Session:    Stress:     Feeling of Stress :    Social Connections:     Frequency of Communication with Friends and Family:     Frequency of Social Gatherings with Friends and Family:     Attends Alev[employer] Services:     Active Member of Clubs or Organizations:     Attends Club or Organization Meetings:     Marital Status:    Intimate Partner Violence:     Fear of Current or Ex-Partner:     Emotionally Abused:     Physically Abused:     Sexually Abused:      No current facility-administered medications for this encounter. Current Outpatient Medications   Medication Sig Dispense Refill    ibuprofen (ADVIL;MOTRIN) 600 MG tablet Take 1 tablet by mouth 3 times daily as needed for Pain With meals 15 tablet 0    acetaminophen (TYLENOL) 500 MG tablet Take 1 tablet by mouth 4 times daily as needed for Pain 360 tablet 1    famotidine (PEPCID) 20 MG tablet TAKE ONE TABLET BY MOUTH ONCE NIGHTLY AS NEEDED FOR REFLUX 90 tablet 1    omeprazole (PRILOSEC) 40 MG delayed release capsule TAKE ONE CAPSULE BY MOUTH EVERY MORNING BEFORE BREAKFAST 30 capsule 2    clotrimazole-betamethasone (LOTRISONE) 1-0.05 % cream Apply topically 2 times daily.  1 Tube 1    promethazine (PHENERGAN) 12.5 MG tablet TAKE ONE TABLET BY MOUTH FOUR TIMES A DAY AS NEEDED FOR NAUSEA 30 tablet 4     Allergies   Allergen Reactions    Effexor [Venlafaxine] Anaphylaxis    Morphine Anaphylaxis    Food      Mckayla cherries       REVIEW OF SYSTEMS  6 systems reviewed, pertinent positives per HPI otherwise noted to be negative    PHYSICAL EXAM  BP (!) 122/93   Pulse 82   Temp 98.2 °F (36.8 °C) (Oral)   Resp 12   Ht 5' 3\" (1.6 m)   Wt 175 lb (79.4 kg)   SpO2 100%   BMI 31.00 kg/m²   GENERAL APPEARANCE: Awake and alert. Cooperative. No acute distress. HEAD: Normocephalic. Atraumatic. EYES: PERRL. EOM's grossly intact. ENT: Mucous membranes are moist.   NECK: Supple. Normal ROM. CHEST: Equal symmetric chest rise. LUNGS: Breathing is unlabored. Speaking comfortably in full sentences. Abdomen: Nondistended  EXTREMITIES: MAEE. No acute deformities. Bilateral lower extremities exhibit: Brisk cap refill <2 seconds, full sensation, 2+ DP/PT pulses, equal strength upon bilateral great toe dorsiflexion/plantar flexion. There is limited ROM of the second and third left toes due to pain. There is no mid, or hindfoot tenderness upon squeeze/palpation. There is mild forefoot tenderness upon squeeze/palpation. Bilateral toes are otherwise warm, pink, dry, well-perfused, neurovascularly intact. SKIN: Warm and dry. NEUROLOGICAL: Alert and oriented. Strength is 5/5 in all extremities and sensation is intact. RADIOLOGY  XR FOOT LEFT (MIN 3 VIEWS)    Result Date: 7/30/2021  EXAMINATION: THREE XRAY VIEWS OF THE LEFT FOOT 7/30/2021 2:40 pm COMPARISON: None. HISTORY: ORDERING SYSTEM PROVIDED HISTORY: 2nd and third left toe injury TECHNOLOGIST PROVIDED HISTORY: Reason for exam:->2nd and third left toe injury Reason for Exam: dropped frozen hamburger on foot Acuity: Acute Type of Exam: Initial FINDINGS: Subtle lucencies are seen through the distal phalanx of the 3rd digit. There is surrounding soft tissue swelling. Metatarsal alignment appears normal.  Subtle spurring is seen at the Achilles insertion. Suspect nondisplaced fracture distal phalanx of 3rd digit         ED COURSE   Patient received ibuprofen and Tylenol for pain, with good relief. A chino taped was applied.   A discussion was had with Ms. Peterson regarding closed fracture of phalanx of left third toe. Risk management discussed and shared decision making had with patient and/or surrogate. All questions were answered. Patient will follow up with her PCP for further evaluation/treatment. Patient will return to ED for new/worsening symptoms. Patient was sent home with a prescription for ibuprofen and Tylenol. MDM  Patient presents to emergency department with left foot pain. Considered alternate diagnoses but less likely based on history and physical.  Considered abrasion/laceration, contusion, sprain/strain, dislocation, bunion, ingrown toenail, among others but less likely based on history and physical.      Work-up reveals    XR left foot: Suspect nondisplaced fracture distal phalanx of third digit    Therefore, I feel that the patient is both safe and appropriate discharged home with outpatient follow-up with PCP for reevaluation of third left toe fracture. The patient's third and fourth toes were chino taped and she was placed in a postop shoe. She was prescribed ibuprofen and Tylenol for discomfort. Return precautions given. Patient verbalized understanding is agreeable to plan for discharge and follow-up. Clinical impression  Closed fracture of phalanx of left third toe, initial encounter        DISPOSITION  Patient was discharged to home in good condition.             Lala Castillo, KATTY - RAYMON  08/04/21 9539

## 2021-08-04 NOTE — TELEPHONE ENCOUNTER
Provider:Leilani   Has the patient been vaccinated against COVID? No  Procedure:EGD  Sedation:MAC  Type of prep:NPO after midnight  Location:[place]  Prep instructions provided to patient during office visit

## 2021-08-04 NOTE — PROGRESS NOTES
How long have you had abdominal pain - 15 years    Where is the abdominal pain located - Middle of abdomen    How would you describe the pain - Cramping    Does it get worse after eating - Yes    Does the pain radiate from where it starts  - Yes to her sides

## 2021-08-04 NOTE — PROGRESS NOTES
[address] [facility] [address], [address] [facility]  Phone: [phone]   Fax:[phone]        [facility] She is a Single [1] White (non-) [1] 28 y.o. Danny Founds female        Main Problems/Chief Complaint   N, V and diarrhea episodes    HPI    The patient has had persistent, but intermittent nausea and vomiting. She vomits bilious material.  She has a lot of upper abdominal discomfort and bloating. She has also had episodes of watery diarrhea with yellow-tinged stool. Both the pain and the diarrhea are usually after eating. She does not relate the symptoms to a specific type of food. There is no fever, weight loss or anorexia. She is status post cholecystectomy. The patient has had symptomatic treatment rather than a definitive diagnosis. She did not pursue the diagnostic testing mainly because she lost her insurance. PAST MEDICAL HISTORY     Past Medical History:   Diagnosis Date    Bipolar affect, depressed ([address]     Depression     GERD (gastroesophageal reflux disease)     IBS (irritable bowel syndrome)      FAMILY HISTORY     Family History   Problem Relation Age of Onset    Cancer Mother 48        RCC     SOCIAL HISTORY     Social History     Socioeconomic History    Marital status: Single     Spouse name: Not on file    Number of children: Not on file    Years of education: Not on file    Highest education level: Not on file   Occupational History    Not on file   Tobacco Use    Smoking status: Current Every Day Smoker     Packs/day: 0.50     Types: Cigarettes    Smokeless tobacco: Never Used   Vaping Use    Vaping Use: Never used   Substance and Sexual Activity    Alcohol use:  Yes     Alcohol/week: 0.0 - 1.0 standard drinks     Comment: social - one every 2 -3 months    Drug use: No    Sexual activity: Yes     Birth control/protection: Injection   Other Topics Concern    Not on file   Social History Narrative    Not on file     Social Determinants of Health     Financial Resource Strain:     Difficulty of Paying Living Expenses:    Food Insecurity:     Worried About Running Out of Food in the Last Year:     [address] in the Last Year:    Transportation Needs:     Lack of Transportation (Medical):  Lack of Transportation (Non-Medical):    Physical Activity:     Days of Exercise per Week:     Minutes of Exercise per Session:    Stress:     Feeling of Stress :    Social Connections:     Frequency of Communication with Friends and Family:     Frequency of Social Gatherings with Friends and Family:     Attends Islam Services:     Active Member of Clubs or Organizations:     Attends Club or Organization Meetings:     Marital Status:    Intimate Partner Violence:     Fear of Current or Ex-Partner:     Emotionally Abused:     Physically Abused:     Sexually Abused:      SURGICAL HISTORY     Past Surgical History:   Procedure Laterality Date    CHOLECYSTECTOMY      LEEP N/A 6/25/2019    LOOP ELECTROSURGICAL EXCISION PROCEDURE performed by Mj Richey MD at [address] [place] [address],[address]   (This list may include medications prescribed during this encounter as epic can not insert only the list prior to this encounter.)  Current Outpatient Rx   Medication Sig Dispense Refill    famotidine (PEPCID) 20 MG tablet TAKE ONE TABLET BY MOUTH ONCE NIGHTLY AS NEEDED FOR REFLUX 90 tablet 1    ibuprofen (ADVIL;MOTRIN) 600 MG tablet Take 1 tablet by mouth 3 times daily as needed for Pain With meals 15 tablet 0    acetaminophen (TYLENOL) 500 MG tablet Take 1 tablet by mouth 4 times daily as needed for Pain 360 tablet 1    omeprazole (PRILOSEC) 40 MG delayed release capsule TAKE ONE CAPSULE BY MOUTH EVERY MORNING BEFORE BREAKFAST 30 capsule 2    clotrimazole-betamethasone (LOTRISONE) 1-0.05 % cream Apply topically 2 times daily.  1 Tube 1    promethazine (PHENERGAN) 12.5 MG tablet TAKE ONE TABLET BY MOUTH FOUR TIMES A DAY AS NEEDED FOR NAUSEA 30 tablet 4       ALLERGIES     Allergies   Allergen Reactions    Effexor [Venlafaxine] Anaphylaxis    Morphine Anaphylaxis    Food      Mckayla cherries       REVIEW OF SYSTEMS   No chest pain suspicious for cardiac origin  No SOB    PHYSICAL EXAM   VITAL SIGNS: /78 (Site: Left Wrist, Position: Sitting)   Pulse 82   Ht 5' 3\" (1.6 m)   Wt 181 lb 3.2 oz (82.2 kg)   BMI 32.10 kg/m²   Wt Readings from Last 1 Encounters:   08/04/21 181 lb 3.2 oz (82.2 kg)     Constitutional: Well developed, Well nourished, No acute distress, Non-toxic appearance. Heart: WNL  Lungs: Clear to A&P  Abd: soft, non-tender, no masses are felt. Neurologic: Alert & oriented x 3. Psych: Good mood and memory. Pt is able to make appropriate decisions. FINAL IMPRESSION AND RECOMMENDATIONS     Most probably bile reflux gastritis - bilious vomiting, bile-containing diarrhea. EGD with Bx and possible polypectomy, dilation, cauterization and other interventions during the procedure as needed is recommended. As she is not able to tolerate large amount of liquid, she does not want to go through the prep of colonoscopy at this time. So only EGD for now. The patient is explained the above procedure(s) in simple words along with its need, risks, benefits and alternatives. The risks explained to the patient included, but are not limited to, bleeding, perforation, infection, suppression of breathing, heart attack, stroke, need for hospitalization and/or surgery and remotely even death. There is some possibility of the patient experiencing discomfort or pain during and after the procedure remains. All the risks put together account for  0.03% of cases. The prognosis is explained if the procedure is not done. The patient has voiced the understanding of the above and has been given opportunity to ask questions. No question was left unanswered.      The informed consent for the above procedure(s) is obtained. The total time spent face-to-face during this visit and before and after the visit was 30 minutes with more than 50% of the time spent in reviewing the electronic chart which showed cholecystectomy, coordination of care for setting up EGD (with the understanding that at a later time she needs a colonoscopy) and counseling the patient about the above problems and their management and about risk benefits, prognosis and alternatives to EGD. EGD w MAC at [facility]    I have seen the above patient in the capacity of a GI consultant. This consultation does not establish patient-physician relationship between a patient and a primary care provider. Mirian Moulton MD 8/4/21 2:50 PM EDT    CC:  Jesika Rosas MD      IMPORTANT: Please note that some portions of this note may have been created using Dragon voice recognition software. Some \"sound-alike\" and totally wrong word substitutions may have taken place due to known inherent limitations of any such software, including this voice recognition software. In spite of efforts to eliminate such errors, some may not have been corrected. So please read the note with this in mind and recognize such mistakes and understand the correct version using the  context. Thanks.

## 2021-08-06 DIAGNOSIS — R11.2 INTRACTABLE VOMITING WITH NAUSEA, UNSPECIFIED VOMITING TYPE: Primary | ICD-10-CM

## 2021-08-13 NOTE — PROGRESS NOTES
Obstructive Sleep Apnea (SHRUTI) Screening     Patient:  Reinier Edmondson    YOB: 1985      Medical Record #:  [id number]                     Date:  8/13/2021     1. Are you a loud and/or regular snorer? []  Yes       [x] No    2. Have you been observed to gasp or stop breathing during sleep? []  Yes       [x] No    3. Do you feel tired or groggy upon awakening or do you awaken with a headache?           []  Yes       [] No    4. Are you often tired or fatigued during the wake time hours? []  Yes       [] No    5. Do you fall asleep sitting, reading, watching TV or driving? []  Yes       [] No    6. Do you often have problems with memory or concentration? []  Yes       [] No    **If patient's score is ? 3 they are considered high risk for SHRUTI. An Anesthesia provider will evaluate the patient and develop a plan of care the day of surgery. Note:  If the patient's BMI is more than 35 kg m¯² , has neck circumference > 40 cm, and/or high blood pressure the risk is greater (© American Sleep Apnea Association, 2006).

## 2021-08-16 ENCOUNTER — HOSPITAL ENCOUNTER (OUTPATIENT)
Age: 36
Discharge: HOME | End: 2021-08-16
Payer: MEDICAID

## 2021-08-16 PROCEDURE — U0005 INFEC AGEN DETEC AMPLI PROBE: HCPCS

## 2021-08-16 PROCEDURE — U0003 INFECTIOUS AGENT DETECTION BY NUCLEIC ACID (DNA OR RNA); SEVERE ACUTE RESPIRATORY SYNDROME CORONAVIRUS 2 (SARS-COV-2) (CORONAVIRUS DISEASE [COVID-19]), AMPLIFIED PROBE TECHNIQUE, MAKING USE OF HIGH THROUGHPUT TECHNOLOGIES AS DESCRIBED BY CMS-2020-01-R: HCPCS

## 2021-08-17 ENCOUNTER — ANESTHESIA EVENT (OUTPATIENT)
Dept: ENDOSCOPY | Age: 36
End: 2021-08-17
Payer: MEDICAID

## 2021-08-17 LAB — SARS-COV-2: NOT DETECTED

## 2021-08-18 ENCOUNTER — HOSPITAL ENCOUNTER (OUTPATIENT)
Age: 36
Setting detail: OUTPATIENT SURGERY
Discharge: HOME | End: 2021-08-18
Attending: INTERNAL MEDICINE | Admitting: INTERNAL MEDICINE
Payer: MEDICAID

## 2021-08-18 ENCOUNTER — ANESTHESIA (OUTPATIENT)
Dept: ENDOSCOPY | Age: 36
End: 2021-08-18
Payer: MEDICAID

## 2021-08-18 VITALS — OXYGEN SATURATION: 98 % | SYSTOLIC BLOOD PRESSURE: 107 MMHG | DIASTOLIC BLOOD PRESSURE: 50 MMHG

## 2021-08-18 VITALS
HEART RATE: 62 BPM | OXYGEN SATURATION: 100 % | HEIGHT: 63 IN | WEIGHT: 175 LBS | SYSTOLIC BLOOD PRESSURE: 118 MMHG | BODY MASS INDEX: 31.01 KG/M2 | TEMPERATURE: 97.9 F | RESPIRATION RATE: 18 BRPM | DIASTOLIC BLOOD PRESSURE: 79 MMHG

## 2021-08-18 DIAGNOSIS — R11.2 INTRACTABLE VOMITING WITH NAUSEA, UNSPECIFIED VOMITING TYPE: ICD-10-CM

## 2021-08-18 LAB — PREGNANCY, URINE: NEGATIVE

## 2021-08-18 PROCEDURE — 2580000003 HC RX 258: Performed by: INTERNAL MEDICINE

## 2021-08-18 PROCEDURE — 6360000002 HC RX W HCPCS: Performed by: NURSE ANESTHETIST, CERTIFIED REGISTERED

## 2021-08-18 PROCEDURE — 84703 CHORIONIC GONADOTROPIN ASSAY: CPT

## 2021-08-18 PROCEDURE — 2500000003 HC RX 250 WO HCPCS: Performed by: NURSE ANESTHETIST, CERTIFIED REGISTERED

## 2021-08-18 PROCEDURE — 88305 TISSUE EXAM BY PATHOLOGIST: CPT

## 2021-08-18 PROCEDURE — 7100000010 HC PHASE II RECOVERY - FIRST 15 MIN: Performed by: INTERNAL MEDICINE

## 2021-08-18 PROCEDURE — 7100000011 HC PHASE II RECOVERY - ADDTL 15 MIN: Performed by: INTERNAL MEDICINE

## 2021-08-18 PROCEDURE — 2580000003 HC RX 258: Performed by: ANESTHESIOLOGY

## 2021-08-18 PROCEDURE — 3609012400 HC EGD TRANSORAL BIOPSY SINGLE/MULTIPLE: Performed by: INTERNAL MEDICINE

## 2021-08-18 PROCEDURE — 3700000001 HC ADD 15 MINUTES (ANESTHESIA): Performed by: INTERNAL MEDICINE

## 2021-08-18 PROCEDURE — 3700000000 HC ANESTHESIA ATTENDED CARE: Performed by: INTERNAL MEDICINE

## 2021-08-18 PROCEDURE — 2709999900 HC NON-CHARGEABLE SUPPLY: Performed by: INTERNAL MEDICINE

## 2021-08-18 RX ORDER — FENTANYL CITRATE 50 UG/ML
25 INJECTION, SOLUTION INTRAMUSCULAR; INTRAVENOUS EVERY 5 MIN PRN
Status: DISCONTINUED | OUTPATIENT
Start: 2021-08-18 | End: 2021-08-18 | Stop reason: HOSPADM

## 2021-08-18 RX ORDER — PROPOFOL 10 MG/ML
INJECTION, EMULSION INTRAVENOUS PRN
Status: DISCONTINUED | OUTPATIENT
Start: 2021-08-18 | End: 2021-08-18 | Stop reason: SDUPTHER

## 2021-08-18 RX ORDER — MEPERIDINE HYDROCHLORIDE 50 MG/ML
12.5 INJECTION INTRAMUSCULAR; INTRAVENOUS; SUBCUTANEOUS EVERY 5 MIN PRN
Status: DISCONTINUED | OUTPATIENT
Start: 2021-08-18 | End: 2021-08-18 | Stop reason: HOSPADM

## 2021-08-18 RX ORDER — SODIUM CHLORIDE 9 MG/ML
25 INJECTION, SOLUTION INTRAVENOUS PRN
Status: DISCONTINUED | OUTPATIENT
Start: 2021-08-18 | End: 2021-08-18 | Stop reason: HOSPADM

## 2021-08-18 RX ORDER — HYDRALAZINE HYDROCHLORIDE 20 MG/ML
5 INJECTION INTRAMUSCULAR; INTRAVENOUS EVERY 10 MIN PRN
Status: DISCONTINUED | OUTPATIENT
Start: 2021-08-18 | End: 2021-08-18 | Stop reason: HOSPADM

## 2021-08-18 RX ORDER — LIDOCAINE HYDROCHLORIDE 10 MG/ML
1 INJECTION, SOLUTION EPIDURAL; INFILTRATION; INTRACAUDAL; PERINEURAL
Status: DISCONTINUED | OUTPATIENT
Start: 2021-08-18 | End: 2021-08-18 | Stop reason: HOSPADM

## 2021-08-18 RX ORDER — SODIUM CHLORIDE, SODIUM LACTATE, POTASSIUM CHLORIDE, CALCIUM CHLORIDE 600; 310; 30; 20 MG/100ML; MG/100ML; MG/100ML; MG/100ML
INJECTION, SOLUTION INTRAVENOUS CONTINUOUS
Status: DISCONTINUED | OUTPATIENT
Start: 2021-08-18 | End: 2021-08-18 | Stop reason: HOSPADM

## 2021-08-18 RX ORDER — ONDANSETRON 2 MG/ML
4 INJECTION INTRAMUSCULAR; INTRAVENOUS
Status: DISCONTINUED | OUTPATIENT
Start: 2021-08-18 | End: 2021-08-18 | Stop reason: HOSPADM

## 2021-08-18 RX ORDER — SODIUM CHLORIDE 0.9 % (FLUSH) 0.9 %
10 SYRINGE (ML) INJECTION PRN
Status: DISCONTINUED | OUTPATIENT
Start: 2021-08-18 | End: 2021-08-18 | Stop reason: HOSPADM

## 2021-08-18 RX ORDER — SODIUM CHLORIDE, SODIUM LACTATE, POTASSIUM CHLORIDE, CALCIUM CHLORIDE 600; 310; 30; 20 MG/100ML; MG/100ML; MG/100ML; MG/100ML
INJECTION, SOLUTION INTRAVENOUS ONCE
Status: COMPLETED | OUTPATIENT
Start: 2021-08-18 | End: 2021-08-18

## 2021-08-18 RX ORDER — OXYCODONE HYDROCHLORIDE AND ACETAMINOPHEN 5; 325 MG/1; MG/1
1 TABLET ORAL PRN
Status: DISCONTINUED | OUTPATIENT
Start: 2021-08-18 | End: 2021-08-18 | Stop reason: HOSPADM

## 2021-08-18 RX ORDER — CHOLESTYRAMINE 4 G/9G
1 POWDER, FOR SUSPENSION ORAL 3 TIMES DAILY
Qty: 90 PACKET | Refills: 3 | Status: SHIPPED | OUTPATIENT
Start: 2021-08-18

## 2021-08-18 RX ORDER — SODIUM CHLORIDE 0.9 % (FLUSH) 0.9 %
10 SYRINGE (ML) INJECTION EVERY 12 HOURS SCHEDULED
Status: DISCONTINUED | OUTPATIENT
Start: 2021-08-18 | End: 2021-08-18 | Stop reason: HOSPADM

## 2021-08-18 RX ORDER — OXYCODONE HYDROCHLORIDE AND ACETAMINOPHEN 5; 325 MG/1; MG/1
2 TABLET ORAL PRN
Status: DISCONTINUED | OUTPATIENT
Start: 2021-08-18 | End: 2021-08-18 | Stop reason: HOSPADM

## 2021-08-18 RX ORDER — PROMETHAZINE HYDROCHLORIDE 25 MG/ML
6.25 INJECTION, SOLUTION INTRAMUSCULAR; INTRAVENOUS
Status: DISCONTINUED | OUTPATIENT
Start: 2021-08-18 | End: 2021-08-18 | Stop reason: HOSPADM

## 2021-08-18 RX ORDER — LIDOCAINE HYDROCHLORIDE 20 MG/ML
INJECTION, SOLUTION EPIDURAL; INFILTRATION; INTRACAUDAL; PERINEURAL PRN
Status: DISCONTINUED | OUTPATIENT
Start: 2021-08-18 | End: 2021-08-18 | Stop reason: SDUPTHER

## 2021-08-18 RX ADMIN — LIDOCAINE HYDROCHLORIDE 80 MG: 20 INJECTION, SOLUTION EPIDURAL; INFILTRATION; INTRACAUDAL; PERINEURAL at 10:58

## 2021-08-18 RX ADMIN — SODIUM CHLORIDE, SODIUM LACTATE, POTASSIUM CHLORIDE, AND CALCIUM CHLORIDE: .6; .31; .03; .02 INJECTION, SOLUTION INTRAVENOUS at 10:50

## 2021-08-18 RX ADMIN — PROPOFOL 150 MG: 10 INJECTION, EMULSION INTRAVENOUS at 11:01

## 2021-08-18 RX ADMIN — PROPOFOL 200 MG: 10 INJECTION, EMULSION INTRAVENOUS at 10:58

## 2021-08-18 RX ADMIN — SODIUM CHLORIDE, POTASSIUM CHLORIDE, SODIUM LACTATE AND CALCIUM CHLORIDE: 600; 310; 30; 20 INJECTION, SOLUTION INTRAVENOUS at 10:10

## 2021-08-18 ASSESSMENT — PAIN SCALES - GENERAL
PAINLEVEL_OUTOF10: 0

## 2021-08-18 ASSESSMENT — LIFESTYLE VARIABLES: SMOKING_STATUS: 1

## 2021-08-18 NOTE — H&P
Pertinent Complete H & P EGD  =======================  N, V and diarrhea episodes. The patient has RUQ abdominal pain. No change in clinical picture from GI view point. No chest pain or SOB    PAST MEDICAL HISTORY     Past Medical History:   Diagnosis Date    Bipolar affect, depressed ([address]     Depression     GERD (gastroesophageal reflux disease)     IBS (irritable bowel syndrome)      FAMILY HISTORY     Family History   Problem Relation Age of Onset    Cancer Mother 48        RCC    Cancer Father         bladder     SOCIAL HISTORY     Social History     Socioeconomic History    Marital status: Single     Spouse name: Not on file    Number of children: Not on file    Years of education: Not on file    Highest education level: Not on file   Occupational History    Not on file   Tobacco Use    Smoking status: Current Every Day Smoker     Packs/day: 0.50     Types: Cigarettes    Smokeless tobacco: Never Used   Vaping Use    Vaping Use: Never used   Substance and Sexual Activity    Alcohol use: Yes     Comment: 0-2 drinks per week    Drug use: No    Sexual activity: Yes     Birth control/protection: Injection   Other Topics Concern    Not on file   Social History Narrative    Not on file     Social Determinants of Health     Financial Resource Strain:     Difficulty of Paying Living Expenses:    Food Insecurity:     Worried About Running Out of Food in the Last Year:     Ran Out of Food in the Last Year:    Transportation Needs:     Lack of Transportation (Medical):      Lack of Transportation (Non-Medical):    Physical Activity:     Days of Exercise per Week:     Minutes of Exercise per Session:    Stress:     Feeling of Stress :    Social Connections:     Frequency of Communication with Friends and Family:     Frequency of Social Gatherings with Friends and Family:     Attends [employer] Services:     Active Member of Clubs or Organizations:     Attends Club or Organization Meetings:     Marital Status:    Intimate Partner Violence:     Fear of Current or Ex-Partner:     Emotionally Abused:     Physically Abused:     Sexually Abused:        SURGICAL HISTORY     Past Surgical History:   Procedure Laterality Date    CHOLECYSTECTOMY      LEEP N/A 6/25/2019    LOOP ELECTROSURGICAL EXCISION PROCEDURE performed by Jasmyn Damon MD at [address] [place] [address],[address]   (This list may include medications prescribed during this encounter as epic can not insert only the list prior to this encounter.)    No current facility-administered medications on file prior to encounter. Current Outpatient Medications on File Prior to Encounter   Medication Sig Dispense Refill    famotidine (PEPCID) 20 MG tablet TAKE ONE TABLET BY MOUTH ONCE NIGHTLY AS NEEDED FOR REFLUX 90 tablet 1    acetaminophen (TYLENOL) 500 MG tablet Take 1 tablet by mouth 4 times daily as needed for Pain 360 tablet 1    omeprazole (PRILOSEC) 40 MG delayed release capsule TAKE ONE CAPSULE BY MOUTH EVERY MORNING BEFORE BREAKFAST 30 capsule 2    promethazine (PHENERGAN) 12.5 MG tablet TAKE ONE TABLET BY MOUTH FOUR TIMES A DAY AS NEEDED FOR NAUSEA 30 tablet 4     ALLERGIES     Allergies   Allergen Reactions    Effexor [Venlafaxine] Anaphylaxis    Morphine Anaphylaxis    Food      Mckayla cherries         PHYSICAL EXAM   VITAL SIGNS: /75   Pulse 60   Temp 97.9 °F (36.6 °C)   Resp 16   Ht 5' 3\" (1.6 m)   Wt 175 lb (79.4 kg)   BMI 31.00 kg/m²   Wt Readings from Last 3 Encounters:   08/13/21 175 lb (79.4 kg)   08/04/21 181 lb 3.2 oz (82.2 kg)   07/30/21 175 lb (79.4 kg)       A & O X3  Lungs: Clear to auscultation  Heart: WNL  Abd: soft,RUQ tenderness. No rebound. BS:+. Plan:   EGD with possible Bx and other interventions if needed. The patient is explained why the above procedure is needed and what is involved with the above procedure in simple words along with its need, risks, benefits and alternatives. The prognosis is explained. The risks explained to the patient included, but were not limited to, bleeding, perforation, infection, suppression of breathing, heart attack, stroke, need for longer hospitalization and/or surgery and remotely even death. The patient has voiced the understanding of the above and has been given opportunity to ask questions. No question was left unanswered. The informed consent for the above procedure is obtained - please see my last virtual/office visit note.

## 2021-08-18 NOTE — ANESTHESIA POSTPROCEDURE EVALUATION
Department of Anesthesiology  Postprocedure Note    Patient: [place] Degree  MRN: [id number]  YOB: 1985  Date of evaluation: 8/18/2021    Procedure Summary     Date: 08/18/21 Room / Location: [address]    Anesthesia Start: 1053 Anesthesia Stop: 1110    Procedure: EGD BIOPSY (N/A ) Diagnosis:       Intractable vomiting with nausea, unspecified vomiting type      (NAUSEA AND VOMITING)    Surgeons: Chandler Ulloa MD Responsible Provider: Haseeb Ramirez MD    Anesthesia Type: MAC, TIVA ASA Status: 2        Anesthesia Type: MAC, TIVA    Sully Phase I: Sully Score: 10    Sully Phase II: Sully Score: 10    Last vitals: Reviewed and per EMR flowsheets.      Anesthesia Post Evaluation   Anesthetic Problems: no   Cardiovascular System Stable: yes  Respiratory Function: Airway Patent yes  ETT no  Ventilator no  Level of consciousness: awake, alert and oriented  Post-op pain: adequate analgesia  Hydration Adequate: yes  Nausea/Vomiting:no  Other Issues:     Latrice Mendes MD

## 2021-08-18 NOTE — ANESTHESIA PRE PROCEDURE
Department of Anesthesiology  Preprocedure Note       Name:  Delmy Aguirre   Age:  39 y.o.  :  1985                                          MRN:  [id number]         Date:  2021      Surgeon:  Amador Snyder MD    Procedure: EGD    Medications prior to admission:    famotidine (PEPCID) 20 MG tablet TAKE ONE TABLET BY MOUTH ONCE NIGHTLY AS NEEDED FOR REFLUX   acetaminophen (TYLENOL) 500 MG tablet Take 1 tablet by mouth 4 times daily as needed for Pain   omeprazole (PRILOSEC) 40 MG delayed release capsule TAKE ONE CAPSULE BY MOUTH EVERY MORNING BEFORE BREAKFAST   promethazine (PHENERGAN) 12.5 MG tablet TAKE ONE TABLET BY MOUTH FOUR TIMES A DAY AS NEEDED FOR NAUSEA     Allergies:      Effexor [Venlafaxine] Anaphylaxis    Morphine Anaphylaxis    Food      Mckayla cherries     Problem List:     MAGALI (generalized anxiety disorder)    Hiatal hernia    Functional diarrhea    Other constipation    Gastroesophageal reflux disease    Cervical dysplasia    Alternating constipation and diarrhea    Generalized abdominal pain    Dyspepsia    Non-intractable vomiting with nausea     Past Medical History:     Bipolar affect, depressed ([address]     Depression     GERD (gastroesophageal reflux disease)     IBS (irritable bowel syndrome)      Past Surgical History:    Procedure Laterality Date    CHOLECYSTECTOMY      LEEP N/A 2019    LOOP ELECTROSURGICAL EXCISION PROCEDURE performed by Gabrielle Burnett MD at [address] [place] [address], [place] [address] History:     Smoking status: Current Every Day Smoker     Packs/day: 0.50     Types: Cigarettes    Smokeless tobacco: Never Used   Substance Use Topics    Alcohol use: Yes     Comment: 0-2 drinks per week                                Ready to quit: Not Answered  Counseling given: Not Answered    Vital Signs (Current):    21 1027 21 1008   BP:  124/75   Pulse:  60   Resp:  16   Temp:  97.9 °F (36.6 °C)   Weight: 175 lb (79.4 kg)    Height: 5' 3\" (1.6 m) BP Readings from Last 3 Encounters:   08/18/21 124/75   08/04/21 126/78   07/30/21 (!) 122/93     NPO Status: Time of last liquid consumption: 2200                        Time of last solid consumption: 2200                        Date of last liquid consumption: 08/17/21                        Date of last solid food consumption: 08/17/21    BMI:   Wt Readings from Last 3 Encounters:   08/13/21 175 lb (79.4 kg)   08/04/21 181 lb 3.2 oz (82.2 kg)   07/30/21 175 lb (79.4 kg)     Body mass index is 31 kg/m². CBC:    WBC 9.2 05/28/2021    HGB 13.4 05/28/2021    HCT 38.7 05/28/2021     05/28/2021     CMP:     05/28/2021    K 4.2 05/28/2021     05/28/2021    CO2 21 05/28/2021    BUN 8 05/28/2021    CREATININE 0.6 05/28/2021    GLUCOSE 83 05/28/2021    PROT 7.4 05/28/2021    CALCIUM 9.4 05/28/2021    BILITOT <0.2 05/28/2021    ALKPHOS 89 05/28/2021    AST 11 05/28/2021    ALT 8 00/41/4230     HCG (If Applicable):    PREGTESTUR Negative 08/18/2021      COVID-19 Screening (If Applicable):     COVID19 Not Detected 08/16/2021     Anesthesia Evaluation  Patient summary reviewed and Nursing notes reviewed no history of anesthetic complications:   Airway: Mallampati: II  TM distance: >3 FB   Neck ROM: full  Mouth opening: > = 3 FB Dental:          Pulmonary: breath sounds clear to auscultation  (+) current smoker    (-) COPD, asthma, recent URI, sleep apnea and wheezes                           Cardiovascular:  Exercise tolerance: good (>4 METS),       (-) dysrhythmias,  angina,  PERLA and murmur      Rhythm: regular  Rate: normal                    Neuro/Psych:   (+) psychiatric history:depression/anxiety    (-) seizures, TIA and CVA           GI/Hepatic/Renal:   (+) hiatal hernia, GERD:,      (-) hepatitis, liver disease and no renal disease       Endo/Other:        (-) diabetes mellitus, hypothyroidism               Abdominal:             Vascular:     - DVT and PE. Other Findings:           Anesthesia Plan      MAC and TIVA     ASA 2       Induction: intravenous. Anesthetic plan and risks discussed with patient. Plan discussed with CRNA.             Max Holt MD

## 2021-08-18 NOTE — OP NOTE
Operative Note      Patient: Karey Oliver  YOB: 1985  MRN: [id number]    Date of Procedure: 2021    Pre-Op Diagnosis: NAUSEA AND VOMITING    Post-Op Diagnosis: Bile reflux gastritis. Sliding hiatal hernia        Procedure(s):  EGD BIOPSY    Surgeon(s):  Saulo Drake MD    Assistant:   * No surgical staff found *    Anesthesia: General    Estimated Blood Loss (mL): Minimal    Complications: None    Specimens:   ID Type Source Tests Collected by Time Destination   A :  Tissue Tissue SURGICAL PATHOLOGY Saulo Drake MD 2021 1100    B :  Tissue Tissue SURGICAL PATHOLOGY Saulo Drake MD 2021 1102    C :  Tissue Tissue SURGICAL PATHOLOGY Saulo Drake MD 2021 1106        Implants:  * No implants in log *      Drains: * No LDAs found *    [address] [place],  [address] [facility]  Phone: [phone] [facility] [address],  [address], [address] [facility]  Phone: .[phone]    EGD Procedure Note    Patient: Karey Oliver  : 1985    Procedure: Esophagogastroduodenoscopy with Bx    Date:  2021     Endoscopist:  Saulo Drake MD, MD    Referring Physician:  Sandra Clay MD    Anesthesia: Anesthesia: MAC    Procedure Details  The patient was placed in the left lateral decubitus position. A bite block was placed. The patient was monitored with ECG tracing, pulse oximetry, blood pressure monitoring, and direct observation. An upper endoscope was inserted through the bite into the mouth and advanced under direct vision to into the esophagus and gradually to the duodenum. A careful inspection was made during the procedure including a retroflexed view of the proximal stomach including views of the incisura and cardia. The findings and interventions are described below. Findings:   Grade M esophagitis involving the lower one fourth of the esophagus.  There was irregular Z-line with a 2 cm

## 2021-08-23 ENCOUNTER — TELEPHONE (OUTPATIENT)
Dept: GASTROENTEROLOGY | Age: 36
End: 2021-08-23

## 2021-08-23 NOTE — TELEPHONE ENCOUNTER
Spoke to patient regarding result letter. Patient aware of below messae, letter mailed        The pathology findings are compatible with bile reflux gastritis for which you should take Questran that I have already prescribed to you. I hope you are already feeling better. No celiac disease was found. This is good news. 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 You should have a repeat upper endoscopy in about 3 years time to keep an eye on this condition which has slightly increased risk of esophageal cancer. Please take precautions to reduce the reflux. If you have not received instructions my office staff can mail you the instructions for reflux. As your problem is likely to be related to a bile rather than acid, you should avoid strong acid suppressor's which have side effects. But I do not see any major harm in taking minor ones such as Pepcid 20 mg at bedtime. Please follow-up with your primary care physician in about 6 to 8 weeks time if you are better. If not, please make an appointment to see me after 4 weeks.     Sincerely,        Margarita Mayen MD

## 2021-08-24 ENCOUNTER — TELEPHONE (OUTPATIENT)
Dept: SURGERY | Age: 36
End: 2021-08-24

## 2021-08-24 NOTE — TELEPHONE ENCOUNTER
Darius Bateman is an inert substance. It has a tendency to sit in the stomach and swell. Some of the symptoms seem to be related to that. But it is a medicine that has very few other real side effects and can be taken on a long-term basis. Therefore, I would advise the patient not to give up on it. She can take 2 g instead of 4 g, 3 times a day with meals and see how she does. My anticipation is that she will tolerate that and will have less side effects. Her GI tract might need a little bit of time to adjust to it.

## 2021-08-24 NOTE — TELEPHONE ENCOUNTER
Patient is experiencing nausea and heartburn when she  takes the cholestyramine (QUESTRAN) 4 g packet. Please advice.

## 2021-09-20 ENCOUNTER — APPOINTMENT (OUTPATIENT)
Dept: GENERAL RADIOLOGY | Age: 36
End: 2021-09-20
Payer: MEDICAID

## 2021-09-20 ENCOUNTER — HOSPITAL ENCOUNTER (EMERGENCY)
Age: 36
Discharge: HOME | End: 2021-09-20
Payer: MEDICAID

## 2021-09-20 VITALS
WEIGHT: 165 LBS | OXYGEN SATURATION: 100 % | DIASTOLIC BLOOD PRESSURE: 91 MMHG | HEART RATE: 79 BPM | BODY MASS INDEX: 29.23 KG/M2 | TEMPERATURE: 98.4 F | RESPIRATION RATE: 18 BRPM | SYSTOLIC BLOOD PRESSURE: 131 MMHG | HEIGHT: 63 IN

## 2021-09-20 DIAGNOSIS — S60.011A CONTUSION OF RIGHT THUMB WITHOUT DAMAGE TO NAIL, INITIAL ENCOUNTER: Primary | ICD-10-CM

## 2021-09-20 PROCEDURE — 73140 X-RAY EXAM OF FINGER(S): CPT

## 2021-09-20 PROCEDURE — 6370000000 HC RX 637 (ALT 250 FOR IP): Performed by: NURSE PRACTITIONER

## 2021-09-20 PROCEDURE — 99284 EMERGENCY DEPT VISIT MOD MDM: CPT

## 2021-09-20 RX ORDER — ACETAMINOPHEN 500 MG
1000 TABLET ORAL ONCE
Status: COMPLETED | OUTPATIENT
Start: 2021-09-20 | End: 2021-09-20

## 2021-09-20 RX ADMIN — ACETAMINOPHEN 1000 MG: 500 TABLET ORAL at 21:44

## 2021-09-20 ASSESSMENT — PAIN DESCRIPTION - ORIENTATION: ORIENTATION: RIGHT

## 2021-09-20 ASSESSMENT — PAIN SCALES - GENERAL
PAINLEVEL_OUTOF10: 6
PAINLEVEL_OUTOF10: 5

## 2021-09-20 ASSESSMENT — PAIN DESCRIPTION - LOCATION: LOCATION: FINGER (COMMENT WHICH ONE)

## 2021-09-20 ASSESSMENT — PAIN DESCRIPTION - PAIN TYPE: TYPE: ACUTE PAIN

## 2021-09-21 NOTE — ED PROVIDER NOTES
Evaluated by Advanced Practice Provider    [facility]    CHIEF COMPLAINT  Finger Injury (Slammed garbage can handle on right thumb.)    HISTORY OF PRESENT ILLNESS  Vidhi Kaufman is a 39 y.o. female who presents to the ED with complaints of injury to the right thumb. Mechanism of injury: injured on garbage, she was pulling it out of a sleeve, the can tilted, it was wet and slipped and the handle struck her right thumb. Initially it did not hurt a whole lot, pain worsened through the evening. Reports moving the right thumb is painful. Reports numbness into the right thumb. Denies any other injury. Patient has no prior injury to area. Patient denies any cuts, lacerations, or open wounds. Dominant hand: left. Occupation: car wash. The patient is currently rating their pain as 6/10 and describes it as an aching type of pain. Treatments tried prior to arrival in the ED: none. The patient denies other complaints, modifying factors or associated symptoms. The patient arrived to the ED via private car. Nursing notes reviewed.    Past Medical History:   Diagnosis Date    Bipolar affect, depressed ([address]     Depression     GERD (gastroesophageal reflux disease)     IBS (irritable bowel syndrome)      Past Surgical History:   Procedure Laterality Date    CHOLECYSTECTOMY      LEEP N/A 6/25/2019    LOOP ELECTROSURGICAL EXCISION PROCEDURE performed by Adriano Ford MD at [address] [place] [address] 140 N/A 8/18/2021    EGD BIOPSY performed by Karen Ashley MD at [facility]     Family History   Problem Relation Age of Onset    Cancer Mother 48        RCC    Cancer Father         bladder     Social History     Socioeconomic History    Marital status: Single     Spouse name: Not on file    Number of children: Not on file    Years of education: Not on file    Highest education level: Not on file   Occupational History    Not on file   Tobacco Use    Smoking status: Current Every Day Smoker     Packs/day: 0.50     Types: Cigarettes    Smokeless tobacco: Never Used   Vaping Use    Vaping Use: Never used   Substance and Sexual Activity    Alcohol use: Not Currently    Drug use: No    Sexual activity: Yes     Birth control/protection: Injection   Other Topics Concern    Not on file   Social History Narrative    Not on file     Social Determinants of Health     Financial Resource Strain:     Difficulty of Paying Living Expenses:    Food Insecurity:     Worried About Running Out of Food in the Last Year:     Ran Out of Food in the Last Year:    Transportation Needs:     Lack of Transportation (Medical):  Lack of Transportation (Non-Medical):    Physical Activity:     Days of Exercise per Week:     Minutes of Exercise per Session:    Stress:     Feeling of Stress :    Social Connections:     Frequency of Communication with Friends and Family:     Frequency of Social Gatherings with Friends and Family:     Attends Jewish Services:     Active Member of Clubs or Organizations:     Attends Club or Organization Meetings:     Marital Status:    Intimate Partner Violence:     Fear of Current or Ex-Partner:     Emotionally Abused:     Physically Abused:     Sexually Abused:      No current facility-administered medications for this encounter. Current Outpatient Medications   Medication Sig Dispense Refill    promethazine (PHENERGAN) 12.5 MG tablet TAKE ONE TABLET BY MOUTH FOUR TIMES A DAY AS NEEDED FOR NAUSEA 30 tablet 2    cholestyramine (QUESTRAN) 4 g packet Take 1 packet by mouth 3 times daily Reduce the dose to twice a day if you get constipated.  90 packet 3    famotidine (PEPCID) 20 MG tablet TAKE ONE TABLET BY MOUTH ONCE NIGHTLY AS NEEDED FOR REFLUX 90 tablet 1    acetaminophen (TYLENOL) 500 MG tablet Take 1 tablet by mouth 4 times daily as needed for Pain 360 tablet 1    omeprazole (PRILOSEC) 40 MG delayed release capsule TAKE ONE CAPSULE BY MOUTH EVERY MORNING BEFORE BREAKFAST 30 capsule 2     Allergies   Allergen Reactions    Effexor [Venlafaxine] Anaphylaxis    Morphine Anaphylaxis    Food      Mckayla cherries         REVIEW OF SYSTEMS    10 systems reviewed, pertinent positives per HPI otherwise noted to be negative      PHYSICAL EXAM  BP (!) 131/91   Pulse 79   Temp 98.4 °F (36.9 °C) (Oral)   Resp 18   Ht 5' 3\" (1.6 m)   Wt 165 lb (74.8 kg)   SpO2 100%   BMI 29.23 kg/m²   GENERAL APPEARANCE: Well developed, well nourished. Awake and alert. Cooperative. Observed resting in bed. No acute distress. HEAD: Normocephalic. Atraumatic. EYES: Sclera is non-icteric. Conjunctiva normal.  ENT: External ears are normal. Mucous membranes are moist.   NECK: Supple. Normal ROM. Trachea mid-line. Cardiac: Regular rate and rhythm. Capillary refill is brisk in bilateral upper extremities. LUNGS: Breathing is unlabored. Speaking comfortably in full sentences. Equal and symmetric chest rise. Abdomen: Non-distended. Musculoskeletal: Normal ROM. No gross deformities or trauma noted. Moving all extremities equally and appropriately. EXTREMITIES: Tenderness to palpation of the right thumb over the proximal phalanx and MCP joint where there is a small amount of bruising. Bruising: Mild over the proximal phalanx and MCP joint. Erythema: None. Edema: None. Crepitus to palpation: None. Radial pulse +2. Capillary refill less than 3 seconds to the distal tip of the right thumb. Able to flex right thumb DIP and MCPJs. Neurologically intact with full sensation to light touch. Remainder of the hand and wrist exam is normal.   SKIN: Warm and dry. Skin is with good color. Skin is intact. NEUROLOGICAL: Alert and oriented. Strength is 5/5 in all extremities and sensation is intact. Able to distinguish light touch to the palmar surface of the 2nd and 5th finger and to the 1st/2nd interdigital web.    Psychiatric: Mood and affect treatment/ plan of care, and follow up. All questions were answered. Patient will follow up as directed for further evaluation/treatment. The patient was given strict return precautions as we discussed symptoms that would necessitate return to the ED. Patient will return to ED for new/worsening symptoms. The patient verbalized their understanding and agreement with the above plan. I estimate there is LOW risk for CELLULITIS, COMPARTMENT SYNDROME, NECROTIZING FASCIITIS, TENDON OR NEUROVASCULAR INJURY, or FOREIGN BODY, thus I consider the discharge disposition reasonable. Also, there is no evidence or peritonitis, sepsis, or toxicity. Danisha Hubbard and I have discussed the diagnosis and risks, and we agree with discharging home to follow-up with their primary doctor. We also discussed returning to the Emergency Department immediately if new or worsening symptoms occur. We have discussed the symptoms which are most concerning (e.g., changing or worsening pain, fever, numbness, weakness, cool or painful digits) that necessitate immediate return. Clinical Impression    1. Contusion of right thumb without damage to nail, initial encounter        Discharge Vital Signs:  Blood pressure (!) 131/91, pulse 79, temperature 98.4 °F (36.9 °C), temperature source Oral, resp. rate 18, height 5' 3\" (1.6 m), weight 165 lb (74.8 kg), SpO2 100 %, not currently breastfeeding. Patient was sent home with a prescription for below medication/s. I did [place] patient on appropriate use of these medication. Discharge Medication List as of 9/20/2021  9:40 PM          FOLLOW UP  [place] [address] [address] [facility] [id number] [phone]    Call   As needed    [facility]  ED  [address]  Go to   If symptoms worsen      DISPOSITION  Patient was discharged to home in good condition.     Comment: Please note this report has been produced using speech recognition software and may contain errors related to that system including errors in grammar, punctuation, and spelling, as well as words and phrases that may be inappropriate. If there are any questions or concerns please feel free to contact the dictating provider for clarification.             Exie Areas, APRN - CNP  09/20/21 [id number]

## 2021-09-21 NOTE — ED NOTES
Discharge instructions reviewed, verbalized understanding. Discharged in stable condition, ambulatory at discharge.      Lis Quinones RN  09/20/21 [id number]

## 2022-01-03 RX ORDER — PROMETHAZINE HYDROCHLORIDE 12.5 MG/1
TABLET ORAL
Qty: 30 TABLET | Refills: 2 | Status: SHIPPED | OUTPATIENT
Start: 2022-01-03 | End: 2022-03-30

## 2022-01-03 NOTE — TELEPHONE ENCOUNTER
Refill Request     Last Seen: Last Seen Department: 5/28/2021  Last Seen by PCP: 5/28/2021    Last Written: 9/13/2021    Next Appointment:   No future appointments.           Requested Prescriptions     Pending Prescriptions Disp Refills    promethazine (PHENERGAN) 12.5 MG tablet 30 tablet 2     Sig: TAKE ONE TABLET BY MOUTH FOUR TIMES A DAY AS NEEDED FOR NAUSEA

## 2022-03-30 RX ORDER — PROMETHAZINE HYDROCHLORIDE 12.5 MG/1
TABLET ORAL
Qty: 30 TABLET | Refills: 2 | Status: SHIPPED | OUTPATIENT
Start: 2022-03-30 | End: 2022-06-17

## 2022-03-30 NOTE — TELEPHONE ENCOUNTER
Refill Request     Last Seen: Last Seen Department: 5/28/2021  Last Seen by PCP: 5/28/2021    Last Written: 01/03/2022 30 Tablet 2 refills    Next Appointment:   No future appointments.     Requested Prescriptions     Pending Prescriptions Disp Refills    promethazine (PHENERGAN) 12.5 MG tablet [Pharmacy Med Name: PROMETHAZINE 12.5 MG TABLET] 30 tablet 2     Sig: TAKE ONE TABLET BY MOUTH FOUR TIMES A DAY AS NEEDED FOR NAUSEA

## 2022-05-18 ENCOUNTER — APPOINTMENT (OUTPATIENT)
Dept: GENERAL RADIOLOGY | Age: 37
End: 2022-05-18
Payer: MEDICAID

## 2022-05-18 ENCOUNTER — HOSPITAL ENCOUNTER (EMERGENCY)
Age: 37
Discharge: HOME | End: 2022-05-18
Payer: MEDICAID

## 2022-05-18 VITALS
RESPIRATION RATE: 16 BRPM | OXYGEN SATURATION: 99 % | SYSTOLIC BLOOD PRESSURE: 105 MMHG | DIASTOLIC BLOOD PRESSURE: 75 MMHG | HEART RATE: 78 BPM | TEMPERATURE: 98.8 F

## 2022-05-18 DIAGNOSIS — E87.6 HYPOKALEMIA: ICD-10-CM

## 2022-05-18 DIAGNOSIS — U07.1 COVID: ICD-10-CM

## 2022-05-18 DIAGNOSIS — R11.2 NAUSEA AND VOMITING, INTRACTABILITY OF VOMITING NOT SPECIFIED, UNSPECIFIED VOMITING TYPE: Primary | ICD-10-CM

## 2022-05-18 LAB
A/G RATIO: 1.6 (ref 1.1–2.2)
ALBUMIN SERPL-MCNC: 4.7 G/DL (ref 3.4–5)
ALP BLD-CCNC: 97 U/L (ref 40–129)
ALT SERPL-CCNC: 37 U/L (ref 10–40)
ANION GAP SERPL CALCULATED.3IONS-SCNC: 13 MMOL/L (ref 3–16)
AST SERPL-CCNC: 41 U/L (ref 15–37)
BASOPHILS ABSOLUTE: 0 K/UL (ref 0–0.2)
BASOPHILS RELATIVE PERCENT: 0.1 %
BILIRUB SERPL-MCNC: 0.3 MG/DL (ref 0–1)
BILIRUBIN URINE: ABNORMAL
BLOOD, URINE: NEGATIVE
BUN BLDV-MCNC: 6 MG/DL (ref 7–20)
CALCIUM SERPL-MCNC: 9.7 MG/DL (ref 8.3–10.6)
CHLORIDE BLD-SCNC: 100 MMOL/L (ref 99–110)
CLARITY: CLEAR
CO2: 25 MMOL/L (ref 21–32)
COLOR: YELLOW
CREAT SERPL-MCNC: 0.6 MG/DL (ref 0.6–1.1)
EOSINOPHILS ABSOLUTE: 0 K/UL (ref 0–0.6)
EOSINOPHILS RELATIVE PERCENT: 0.2 %
EPITHELIAL CELLS, UA: ABNORMAL /HPF (ref 0–5)
GFR AFRICAN AMERICAN: >60
GFR NON-AFRICAN AMERICAN: >60
GLUCOSE BLD-MCNC: 105 MG/DL (ref 70–99)
GLUCOSE URINE: NEGATIVE MG/DL
HCT VFR BLD CALC: 35.9 % (ref 36–48)
HEMOGLOBIN: 12.4 G/DL (ref 12–16)
KETONES, URINE: NEGATIVE MG/DL
LEUKOCYTE ESTERASE, URINE: NEGATIVE
LYMPHOCYTES ABSOLUTE: 0.2 K/UL (ref 1–5.1)
LYMPHOCYTES RELATIVE PERCENT: 6.7 %
MAGNESIUM: 2 MG/DL (ref 1.8–2.4)
MCH RBC QN AUTO: 32.2 PG (ref 26–34)
MCHC RBC AUTO-ENTMCNC: 34.5 G/DL (ref 31–36)
MCV RBC AUTO: 93.3 FL (ref 80–100)
MICROSCOPIC EXAMINATION: YES
MONOCYTES ABSOLUTE: 0.4 K/UL (ref 0–1.3)
MONOCYTES RELATIVE PERCENT: 11.7 %
MUCUS: ABNORMAL /LPF
NEUTROPHILS ABSOLUTE: 2.9 K/UL (ref 1.7–7.7)
NEUTROPHILS RELATIVE PERCENT: 81.3 %
NITRITE, URINE: NEGATIVE
PDW BLD-RTO: 13.5 % (ref 12.4–15.4)
PH UA: 6.5 (ref 5–8)
PLATELET # BLD: 190 K/UL (ref 135–450)
PMV BLD AUTO: 8.2 FL (ref 5–10.5)
POTASSIUM SERPL-SCNC: 2.9 MMOL/L (ref 3.5–5.1)
PROTEIN UA: ABNORMAL MG/DL
RBC # BLD: 3.84 M/UL (ref 4–5.2)
RBC UA: ABNORMAL /HPF (ref 0–4)
SARS-COV-2, NAAT: DETECTED
SODIUM BLD-SCNC: 138 MMOL/L (ref 136–145)
SPECIFIC GRAVITY UA: 1.02 (ref 1–1.03)
TOTAL PROTEIN: 7.7 G/DL (ref 6.4–8.2)
URINE REFLEX TO CULTURE: ABNORMAL
URINE TYPE: ABNORMAL
UROBILINOGEN, URINE: 0.2 E.U./DL
WBC # BLD: 3.6 K/UL (ref 4–11)
WBC UA: ABNORMAL /HPF (ref 0–5)

## 2022-05-18 PROCEDURE — 2580000003 HC RX 258: Performed by: PHYSICIAN ASSISTANT

## 2022-05-18 PROCEDURE — 99285 EMERGENCY DEPT VISIT HI MDM: CPT

## 2022-05-18 PROCEDURE — 6360000002 HC RX W HCPCS: Performed by: PHYSICIAN ASSISTANT

## 2022-05-18 PROCEDURE — 96366 THER/PROPH/DIAG IV INF ADDON: CPT

## 2022-05-18 PROCEDURE — 93005 ELECTROCARDIOGRAM TRACING: CPT | Performed by: EMERGENCY MEDICINE

## 2022-05-18 PROCEDURE — 96372 THER/PROPH/DIAG INJ SC/IM: CPT

## 2022-05-18 PROCEDURE — 85025 COMPLETE CBC W/AUTO DIFF WBC: CPT

## 2022-05-18 PROCEDURE — 96365 THER/PROPH/DIAG IV INF INIT: CPT

## 2022-05-18 PROCEDURE — 83735 ASSAY OF MAGNESIUM: CPT

## 2022-05-18 PROCEDURE — 80053 COMPREHEN METABOLIC PANEL: CPT

## 2022-05-18 PROCEDURE — 96375 TX/PRO/DX INJ NEW DRUG ADDON: CPT

## 2022-05-18 PROCEDURE — 71045 X-RAY EXAM CHEST 1 VIEW: CPT

## 2022-05-18 PROCEDURE — 81001 URINALYSIS AUTO W/SCOPE: CPT

## 2022-05-18 PROCEDURE — 87635 SARS-COV-2 COVID-19 AMP PRB: CPT

## 2022-05-18 RX ORDER — KETOROLAC TROMETHAMINE 30 MG/ML
30 INJECTION, SOLUTION INTRAMUSCULAR; INTRAVENOUS ONCE
Status: COMPLETED | OUTPATIENT
Start: 2022-05-18 | End: 2022-05-18

## 2022-05-18 RX ORDER — POTASSIUM CHLORIDE 20 MEQ/1
20 TABLET, EXTENDED RELEASE ORAL DAILY
Qty: 5 TABLET | Refills: 0 | Status: SHIPPED | OUTPATIENT
Start: 2022-05-18 | End: 2022-05-23

## 2022-05-18 RX ORDER — ONDANSETRON 2 MG/ML
4 INJECTION INTRAMUSCULAR; INTRAVENOUS ONCE
Status: COMPLETED | OUTPATIENT
Start: 2022-05-18 | End: 2022-05-18

## 2022-05-18 RX ORDER — 0.9 % SODIUM CHLORIDE 0.9 %
1000 INTRAVENOUS SOLUTION INTRAVENOUS ONCE
Status: COMPLETED | OUTPATIENT
Start: 2022-05-18 | End: 2022-05-18

## 2022-05-18 RX ORDER — POTASSIUM CHLORIDE 7.45 MG/ML
10 INJECTION INTRAVENOUS ONCE
Status: COMPLETED | OUTPATIENT
Start: 2022-05-18 | End: 2022-05-18

## 2022-05-18 RX ORDER — ONDANSETRON 4 MG/1
4 TABLET, ORALLY DISINTEGRATING ORAL EVERY 8 HOURS PRN
Qty: 20 TABLET | Refills: 0 | Status: SHIPPED | OUTPATIENT
Start: 2022-05-18

## 2022-05-18 RX ADMIN — SODIUM CHLORIDE 1000 ML: 9 INJECTION, SOLUTION INTRAVENOUS at 14:07

## 2022-05-18 RX ADMIN — KETOROLAC TROMETHAMINE 30 MG: 30 INJECTION, SOLUTION INTRAMUSCULAR at 14:06

## 2022-05-18 RX ADMIN — POTASSIUM CHLORIDE 10 MEQ: 7.46 INJECTION, SOLUTION INTRAVENOUS at 14:11

## 2022-05-18 RX ADMIN — ONDANSETRON 4 MG: 2 INJECTION INTRAMUSCULAR; INTRAVENOUS at 14:06

## 2022-05-18 NOTE — Clinical Note
Taiwo Newman was seen and treated in our emergency department on 5/18/2022. She may return to work on 05/23/2022. If you have any questions or concerns, please don't hesitate to call.       CONNIE Montes

## 2022-05-18 NOTE — ED PROVIDER NOTES
[facility] Emergency Department    CHIEF COMPLAINT  Nausea (pt comes from home for n/v since yesterday co workers tested + for covid)      SHARED SERVICE VISIT  Evaluated by BANDAR. My supervising physician was available for consultation. HISTORY OF PRESENT ILLNESS  Suzanne Dumas is a 39 y.o. female who presents to the ED complaining of 1 day history of URI-like symptoms. Patient brought in by father today for evaluation. Patient states that she was exposed to coworkers who tested positive for COVID. She reports that she has had headache with cough and congestion. Denies fevers chills. Non-smoker. No hemoptysis. She denies any chest pain. Reports nausea and vomiting. No diarrhea or constipation. No black bloody stools. Generalized abdominal discomfort. She denies any vaginal bleeding, pain or discharge. No urinary symptoms. Endorses use of Depo-Provera. No other complaints, modifying factors or associated symptoms. Nursing notes reviewed.    Past Medical History:   Diagnosis Date    Bipolar affect, depressed ([facility] [address]     Depression     GERD (gastroesophageal reflux disease)     IBS (irritable bowel syndrome)      Past Surgical History:   Procedure Laterality Date    CHOLECYSTECTOMY      LEEP N/A 6/25/2019    LOOP ELECTROSURGICAL EXCISION PROCEDURE performed by Los Boss MD at [address] N/A 8/18/2021    EGD BIOPSY performed by Federico Ricketts MD at [address]     Family History   Problem Relation Age of Onset    Cancer Mother 48        RCC    Cancer Father         bladder     Social History     Socioeconomic History    Marital status: Single     Spouse name: Not on file    Number of children: Not on file    Years of education: Not on file    Highest education level: Not on file   Occupational History    Not on file   Tobacco Use    Smoking status: Current Every Day Smoker     Packs/day: 0.50     Years: 20.00     Pack years: 10.00     Types: Cigarettes    Smokeless tobacco: Never Used   Vaping Use    Vaping Use: Never used   Substance and Sexual Activity    Alcohol use: Not Currently    Drug use: No    Sexual activity: Yes     Birth control/protection: Injection   Other Topics Concern    Not on file   Social History Narrative    Not on file     Social Determinants of Health     Financial Resource Strain:     Difficulty of Paying Living Expenses: Not on file   Food Insecurity:     Worried About Running Out of Food in the Last Year: Not on file    Lynette of Food in the Last Year: Not on file   Transportation Needs:     Lack of Transportation (Medical): Not on file    Lack of Transportation (Non-Medical):  Not on file   Physical Activity:     Days of Exercise per Week: Not on file    Minutes of Exercise per Session: Not on file   Stress:     Feeling of Stress : Not on file   Social Connections:     Frequency of Communication with Friends and Family: Not on file    Frequency of Social Gatherings with Friends and Family: Not on file    Attends Latter day Services: Not on file    Active Member of [address] [place] [address] or Organizations: Not on file    Attends Club or Organization Meetings: Not on file    Marital Status: Not on file   Intimate Partner Violence:     Fear of Current or Ex-Partner: Not on file    Emotionally Abused: Not on file    Physically Abused: Not on file    Sexually Abused: Not on file   Housing Stability:     Unable to Pay for Housing in the Last Year: Not on file    Number of [place] in the Last Year: Not on file    Unstable Housing in the Last Year: Not on file     Current Facility-Administered Medications   Medication Dose Route Frequency Provider Last Rate Last Admin    0.9 % sodium chloride bolus  1,000 mL IntraVENous Once CONNIE Vann 1,000 mL/hr at 05/18/22 1407 1,000 mL at 05/18/22 1407     Current Outpatient Medications   Medication Sig Dispense Refill    promethazine (PHENERGAN) 12.5 MG tablet TAKE ONE TABLET BY MOUTH FOUR TIMES A DAY AS NEEDED FOR NAUSEA 30 tablet 2    omeprazole (PRILOSEC) 40 MG delayed release capsule TAKE ONE CAPSULE BY MOUTH EVERY MORNING BEFORE BREAKFAST 90 capsule 3    cholestyramine (QUESTRAN) 4 g packet Take 1 packet by mouth 3 times daily Reduce the dose to twice a day if you get constipated. 90 packet 3    famotidine (PEPCID) 20 MG tablet TAKE ONE TABLET BY MOUTH ONCE NIGHTLY AS NEEDED FOR REFLUX 90 tablet 1    acetaminophen (TYLENOL) 500 MG tablet Take 1 tablet by mouth 4 times daily as needed for Pain 360 tablet 1     Allergies   Allergen Reactions    Effexor [Venlafaxine] Anaphylaxis    Morphine Anaphylaxis    Food      Mckayla cherries       REVIEW OF SYSTEMS  10 systems reviewed, pertinent positives per HPI otherwise noted to be negative    PHYSICAL EXAM  /66   Pulse 62   Temp 98.8 °F (37.1 °C) (Oral)   Resp 17   SpO2 100%   GENERAL APPEARANCE: Awake and alert. Cooperative. No acute distress. HEAD: Normocephalic. Atraumatic. EYES: PERRL. EOM's grossly intact. ENT: Mucous membranes are moist.   NECK: Supple. No JVD. No tracheal tenderness or deviation. No crepitus. HEART: RRR. No murmurs. LUNGS: Respirations unlabored. CTAB. Good air exchange. Speaking comfortably in full sentences. ABDOMEN: Soft. Non-distended. Non-tender. No guarding or rebound. Negative Saucedo's, McBurney's and Rovsing's. No fluids or ascites. No hernias or masses. Bowel sounds normal in all quadrants. No CVA tenderness. EXTREMITIES: No peripheral edema. Moves all extremities equally. All extremities neurovascularly intact. SKIN: Warm and dry. No acute rashes. NEUROLOGICAL: Alert and oriented. CN's 2-12 intact. No gross facial drooping. Strength 5/5, sensation intact. PSYCHIATRIC: Normal mood and affect.     RADIOLOGY  XR CHEST PORTABLE    Result Date: 5/18/2022  EXAMINATION: ONE XRAY VIEW OF THE CHEST 5/18/2022 2:00 pm COMPARISON: 11/01/2017 HISTORY: ORDERING SYSTEM PROVIDED HISTORY: cough TECHNOLOGIST PROVIDED HISTORY: Reason for exam:->cough Reason for Exam: cough FINDINGS: There is relative under penetration underlying the breast shadows. The cardiac silhouette, mediastinal hilar contours are normal.  No pleural effusion or consolidation is identified. No acute cardiopulmonary disease on portable chest with mild limitation. RECOMMENDATION: If there is persistent concern, follow-up PA and lateral chest is recommended. ED COURSE  Patient received Zofran and Toradol for nausea and body aches, with good relief. Triage vitals stable. Given 1 L IV fluid bolus. Rapid COVID positive. CMP with a potassium of 2.9. She was given 40 mill equivalents of KCl IV. Normal magnesium. CBC without leukocytosis or anemia. Urinalysis without evidence for infectious process. Stable portable chest x-ray without evidence to suggest acute disease process. EKG normal sinus rhythm without evidence for ischemic changes. Patient tolerated p.o. challenge. Feeling better on reevaluation. Comfortable discharge home with medications for symptomatic treatment. We have discussed return precautions as well as recommendations for follow-up otherwise and she is in agreement and comfortable at discharge. A discussion was had with Ms. Peterson regarding nausea and vomiting, cough, ED findings and recommendations for follow-up. Risk management discussed and shared decision making had with patient and/or surrogate. All questions were answered. Patient will follow up with PCP in 2 to 3 days for further evaluation/treatment. All questions answered. Patient will return to ED for new/worsening symptoms. Patient was sent home with a prescription for Zofran and Tessalon.     MDM  Results for orders placed or performed during the hospital encounter of 05/18/22   COVID-19, Rapid    Specimen: Nasopharyngeal Swab   Result Value Ref Range    SARS-CoV-2, NAAT DETECTED (AA) Not Detected   Comprehensive Metabolic Panel   Result Value Ref Range    Sodium 138 136 - 145 mmol/L    Potassium 2.9 (LL) 3.5 - 5.1 mmol/L    Chloride 100 99 - 110 mmol/L    CO2 25 21 - 32 mmol/L    Anion Gap 13 3 - 16    Glucose 105 (H) 70 - 99 mg/dL    BUN 6 (L) 7 - 20 mg/dL    CREATININE 0.6 0.6 - 1.1 mg/dL    GFR Non-African American >60 >60    GFR African American >60 >60    Calcium 9.7 8.3 - 10.6 mg/dL    Total Protein 7.7 6.4 - 8.2 g/dL    Albumin 4.7 3.4 - 5.0 g/dL    Albumin/Globulin Ratio 1.6 1.1 - 2.2    Total Bilirubin 0.3 0.0 - 1.0 mg/dL    Alkaline Phosphatase 97 40 - 129 U/L    ALT 37 10 - 40 U/L    AST 41 (H) 15 - 37 U/L   CBC with Auto Differential   Result Value Ref Range    WBC 3.6 (L) 4.0 - 11.0 K/uL    RBC 3.84 (L) 4.00 - 5.20 M/uL    Hemoglobin 12.4 12.0 - 16.0 g/dL    Hematocrit 35.9 (L) 36.0 - 48.0 %    MCV 93.3 80.0 - 100.0 fL    MCH 32.2 26.0 - 34.0 pg    MCHC 34.5 31.0 - 36.0 g/dL    RDW 13.5 12.4 - 15.4 %    Platelets 966 316 - 123 K/uL    MPV 8.2 5.0 - 10.5 fL    Neutrophils % 81.3 %    Lymphocytes % 6.7 %    Monocytes % 11.7 %    Eosinophils % 0.2 %    Basophils % 0.1 %    Neutrophils Absolute 2.9 1.7 - 7.7 K/uL    Lymphocytes Absolute 0.2 (L) 1.0 - 5.1 K/uL    Monocytes Absolute 0.4 0.0 - 1.3 K/uL    Eosinophils Absolute 0.0 0.0 - 0.6 K/uL    Basophils Absolute 0.0 0.0 - 0.2 K/uL   Urinalysis with Reflex to Culture    Specimen: Urine   Result Value Ref Range    Color, UA Yellow Straw/Yellow    Clarity, UA Clear Clear    Glucose, Ur Negative Negative mg/dL    Bilirubin Urine SMALL (A) Negative    Ketones, Urine Negative Negative mg/dL    Specific Gravity, UA 1.020 1.005 - 1.030    Blood, Urine Negative Negative    pH, UA 6.5 5.0 - 8.0    Protein, UA TRACE (A) Negative mg/dL    Urobilinogen, Urine 0.2 <2.0 E.U./dL    Nitrite, Urine Negative Negative    Leukocyte Esterase, Urine Negative Negative    Microscopic Examination YES     Urine Type NotGiven     Urine Reflex to Culture Not Indicated    Microscopic Urinalysis   Result Value Ref Range    Mucus, UA Rare (A) None Seen /LPF    WBC, UA 0-2 0 - 5 /HPF    RBC, UA None seen 0 - 4 /HPF    Epithelial Cells, UA 0-1 0 - 5 /HPF   Magnesium   Result Value Ref Range    Magnesium 2.00 1.80 - 2.40 mg/dL   EKG 12 Lead   Result Value Ref Range    Ventricular Rate 71 BPM    Atrial Rate 71 BPM    P-R Interval 138 ms    QRS Duration 84 ms    Q-T Interval 416 ms    QTc Calculation (Bazett) 452 ms    P Axis 72 degrees    R Axis 64 degrees    T Axis 46 degrees    Diagnosis       Normal sinus rhythmNormal ECGWhen compared with ECG of 01-NOV-2017 16:54,No significant change was found     I estimate there is LOW risk for ACUTE CORONARY SYNDROME, INTRACRANIAL HEMORRHAGE, MALIGNANT DYSRHYTHMIA or HYPERTENSION, PULMONARY EMBOLISM, SEPSIS, SUBARACHNOID HEMORRHAGE, SUBDURAL HEMATOMA, STROKE, or THORACIC AORTIC DISSECTION, thus I consider the discharge disposition reasonable. Danisha Hubbard and I have discussed the diagnosis and risks, and we agree with discharging home to follow-up with their primary doctor. We also discussed returning to the Emergency Department immediately if new or worsening symptoms occur. We have discussed the symptoms which are most concerning (e.g., bloody sputum, fever, worsening pain or shortness of breath, vomiting, weakness) that necessitate immediate return. Final Impression  1. Nausea and vomiting, intractability of vomiting not specified, unspecified vomiting type    2. COVID    3. Hypokalemia      Blood pressure 105/75, pulse 78, temperature 98.8 °F (37.1 °C), temperature source Oral, resp. rate 16, SpO2 99 %, not currently breastfeeding. DISPOSITION  Patient was discharged to home in good condition.           Bhanu Harris, [place]  05/18/22 8846

## 2022-05-19 LAB
EKG ATRIAL RATE: 71 BPM
EKG DIAGNOSIS: NORMAL
EKG P AXIS: 72 DEGREES
EKG P-R INTERVAL: 138 MS
EKG Q-T INTERVAL: 416 MS
EKG QRS DURATION: 84 MS
EKG QTC CALCULATION (BAZETT): 452 MS
EKG R AXIS: 64 DEGREES
EKG T AXIS: 46 DEGREES
EKG VENTRICULAR RATE: 71 BPM

## 2022-05-19 PROCEDURE — 93010 ELECTROCARDIOGRAM REPORT: CPT | Performed by: INTERNAL MEDICINE

## 2022-05-22 ENCOUNTER — HOSPITAL ENCOUNTER (EMERGENCY)
Age: 37
Discharge: HOME | End: 2022-05-23
Payer: MEDICAID

## 2022-05-22 DIAGNOSIS — R55 SYNCOPE AND COLLAPSE: Primary | ICD-10-CM

## 2022-05-22 LAB
BASOPHILS ABSOLUTE: 0 K/UL (ref 0–0.2)
BASOPHILS RELATIVE PERCENT: 0.5 %
D DIMER: <200 NG/ML DDU (ref 0–229)
EOSINOPHILS ABSOLUTE: 0 K/UL (ref 0–0.6)
EOSINOPHILS RELATIVE PERCENT: 0.1 %
HCG QUALITATIVE: NEGATIVE
HCT VFR BLD CALC: 42.4 % (ref 36–48)
HEMOGLOBIN: 14.5 G/DL (ref 12–16)
LYMPHOCYTES ABSOLUTE: 1.1 K/UL (ref 1–5.1)
LYMPHOCYTES RELATIVE PERCENT: 19.8 %
MCH RBC QN AUTO: 32.1 PG (ref 26–34)
MCHC RBC AUTO-ENTMCNC: 34.1 G/DL (ref 31–36)
MCV RBC AUTO: 94 FL (ref 80–100)
MONOCYTES ABSOLUTE: 0.6 K/UL (ref 0–1.3)
MONOCYTES RELATIVE PERCENT: 11.3 %
NEUTROPHILS ABSOLUTE: 3.8 K/UL (ref 1.7–7.7)
NEUTROPHILS RELATIVE PERCENT: 68.3 %
PDW BLD-RTO: 13.7 % (ref 12.4–15.4)
PLATELET # BLD: 175 K/UL (ref 135–450)
PMV BLD AUTO: 8.6 FL (ref 5–10.5)
RBC # BLD: 4.51 M/UL (ref 4–5.2)
WBC # BLD: 5.6 K/UL (ref 4–11)

## 2022-05-22 PROCEDURE — 83880 ASSAY OF NATRIURETIC PEPTIDE: CPT

## 2022-05-22 PROCEDURE — 84703 CHORIONIC GONADOTROPIN ASSAY: CPT

## 2022-05-22 PROCEDURE — 85025 COMPLETE CBC W/AUTO DIFF WBC: CPT

## 2022-05-22 PROCEDURE — 84484 ASSAY OF TROPONIN QUANT: CPT

## 2022-05-22 PROCEDURE — 99284 EMERGENCY DEPT VISIT MOD MDM: CPT

## 2022-05-22 PROCEDURE — 80053 COMPREHEN METABOLIC PANEL: CPT

## 2022-05-22 PROCEDURE — 85379 FIBRIN DEGRADATION QUANT: CPT

## 2022-05-22 PROCEDURE — 93005 ELECTROCARDIOGRAM TRACING: CPT | Performed by: PHYSICIAN ASSISTANT

## 2022-05-22 ASSESSMENT — PAIN SCALES - GENERAL: PAINLEVEL_OUTOF10: 6

## 2022-05-22 ASSESSMENT — PAIN DESCRIPTION - LOCATION: LOCATION: CHEST

## 2022-05-22 ASSESSMENT — PAIN DESCRIPTION - FREQUENCY: FREQUENCY: INTERMITTENT

## 2022-05-22 ASSESSMENT — PAIN DESCRIPTION - DESCRIPTORS: DESCRIPTORS: ACHING;SHARP

## 2022-05-22 NOTE — Clinical Note
Dasha Williamson was seen and treated in our emergency department on 5/22/2022. She may return to work on 05/24/2022. If you have any questions or concerns, please don't hesitate to call.       Osvaldo Cardenas PA-C

## 2022-05-23 VITALS
HEART RATE: 61 BPM | RESPIRATION RATE: 13 BRPM | DIASTOLIC BLOOD PRESSURE: 76 MMHG | BODY MASS INDEX: 27.46 KG/M2 | OXYGEN SATURATION: 99 % | TEMPERATURE: 98.1 F | WEIGHT: 155 LBS | HEIGHT: 63 IN | SYSTOLIC BLOOD PRESSURE: 113 MMHG

## 2022-05-23 LAB
A/G RATIO: 1.2 (ref 1.1–2.2)
ALBUMIN SERPL-MCNC: 4.5 G/DL (ref 3.4–5)
ALP BLD-CCNC: 109 U/L (ref 40–129)
ALT SERPL-CCNC: 59 U/L (ref 10–40)
ANION GAP SERPL CALCULATED.3IONS-SCNC: 14 MMOL/L (ref 3–16)
AST SERPL-CCNC: 50 U/L (ref 15–37)
BILIRUB SERPL-MCNC: 0.4 MG/DL (ref 0–1)
BUN BLDV-MCNC: 8 MG/DL (ref 7–20)
CALCIUM SERPL-MCNC: 9.6 MG/DL (ref 8.3–10.6)
CHLORIDE BLD-SCNC: 103 MMOL/L (ref 99–110)
CO2: 22 MMOL/L (ref 21–32)
CREAT SERPL-MCNC: 0.6 MG/DL (ref 0.6–1.1)
EKG ATRIAL RATE: 48 BPM
EKG DIAGNOSIS: NORMAL
EKG P AXIS: 72 DEGREES
EKG P-R INTERVAL: 144 MS
EKG Q-T INTERVAL: 462 MS
EKG QRS DURATION: 86 MS
EKG QTC CALCULATION (BAZETT): 412 MS
EKG R AXIS: 69 DEGREES
EKG T AXIS: 53 DEGREES
EKG VENTRICULAR RATE: 48 BPM
GFR AFRICAN AMERICAN: >60
GFR NON-AFRICAN AMERICAN: >60
GLUCOSE BLD-MCNC: 94 MG/DL (ref 70–99)
POTASSIUM REFLEX MAGNESIUM: 3.7 MMOL/L (ref 3.5–5.1)
PRO-BNP: 25 PG/ML (ref 0–124)
SODIUM BLD-SCNC: 139 MMOL/L (ref 136–145)
TOTAL PROTEIN: 8.2 G/DL (ref 6.4–8.2)
TROPONIN: <0.01 NG/ML

## 2022-05-23 PROCEDURE — 93010 ELECTROCARDIOGRAM REPORT: CPT | Performed by: INTERNAL MEDICINE

## 2022-05-23 RX ORDER — 0.9 % SODIUM CHLORIDE 0.9 %
1000 INTRAVENOUS SOLUTION INTRAVENOUS ONCE
Status: DISCONTINUED | OUTPATIENT
Start: 2022-05-23 | End: 2022-05-23 | Stop reason: HOSPADM

## 2022-05-23 NOTE — ED PROVIDER NOTES
[address] [facility]  ED      CHIEF COMPLAINT  Loss of Consciousness (was walking and woke up on floor, denies hitting head and has no pain)      SHARED SERVICE VISIT  Evaluated by BANDAR. My supervising physician was available for consultation. HISTORY OF PRESENT ILLNESS  Yany Holguin is a 39 y.o. female who presents to the ED complaining of syncopal episode. Patient was diagnosed with COVID 5 days prior. Today patient was walking to the grocery store when she began to feel hot. This was then preceded by a feeling of lightheadedness and her vision started to go dark. She then lost consciousness and ended up on the ground. Upon trying to stand up her symptoms returned and then she sat down and seemed improved. She states that her symptoms are worsened with positional changes such as standing. She states that she does feel slightly short of breath however does not have any significant chest pain. She states that she has to take a big deep breath to catch her breath. But denies any pain with inspiration. No history of coagulopathy or unilateral leg swelling. Patient states that she has had a nosebleed however that has not coughed up any blood. Patient reports a decreased appetite and oral intake over the past few days secondary to COVID. She does have Zofran as have taken a few times. No prior history of syncopal episodes. No history of heart disease. No other complaints, modifying factors or associated symptoms. Nursing notes reviewed.    Past Medical History:   Diagnosis Date    Bipolar affect, depressed ([address]     Depression     GERD (gastroesophageal reflux disease)     IBS (irritable bowel syndrome)      Past Surgical History:   Procedure Laterality Date    CHOLECYSTECTOMY      LEEP N/A 6/25/2019    LOOP ELECTROSURGICAL EXCISION PROCEDURE performed by Nate King MD at [facility] 8/18/2021    EGD BIOPSY performed by Brooks Padilla Unable to Pay for Housing in the Last Year: Not on file    Number of Places Lived in the Last Year: Not on file    Unstable Housing in the Last Year: Not on file     Current Facility-Administered Medications   Medication Dose Route Frequency Provider Last Rate Last Admin    0.9 % sodium chloride bolus  1,000 mL IntraVENous Once Kay Castro PA-C         Current Outpatient Medications   Medication Sig Dispense Refill    ondansetron (ZOFRAN ODT) 4 MG disintegrating tablet Take 1 tablet by mouth every 8 hours as needed for Nausea or Vomiting 20 tablet 0    potassium chloride (KLOR-CON M) 20 MEQ extended release tablet Take 1 tablet by mouth daily for 5 days 5 tablet 0    promethazine (PHENERGAN) 12.5 MG tablet TAKE ONE TABLET BY MOUTH FOUR TIMES A DAY AS NEEDED FOR NAUSEA 30 tablet 2    omeprazole (PRILOSEC) 40 MG delayed release capsule TAKE ONE CAPSULE BY MOUTH EVERY MORNING BEFORE BREAKFAST 90 capsule 3    cholestyramine (QUESTRAN) 4 g packet Take 1 packet by mouth 3 times daily Reduce the dose to twice a day if you get constipated. 90 packet 3    famotidine (PEPCID) 20 MG tablet TAKE ONE TABLET BY MOUTH ONCE NIGHTLY AS NEEDED FOR REFLUX 90 tablet 1    acetaminophen (TYLENOL) 500 MG tablet Take 1 tablet by mouth 4 times daily as needed for Pain 360 tablet 1     Allergies   Allergen Reactions    Effexor [Venlafaxine] Anaphylaxis    Morphine Anaphylaxis    Food      Mckayla cherries       REVIEW OF SYSTEMS  10 systems reviewed, pertinent positives per HPI otherwise noted to be negative    PHYSICAL EXAM  /76   Pulse 57   Temp 98.1 °F (36.7 °C) (Oral)   Resp 20   Ht 5' 3\" (1.6 m)   Wt 155 lb (70.3 kg)   SpO2 99%   BMI 27.46 kg/m²   GENERAL APPEARANCE: Awake and alert. Cooperative. no distress. HEAD: Normocephalic. Atraumatic. EYES: EOM's grossly intact. ENT: Mucous membranes are moist.   NECK: Supple. HEART: RRR. No murmurs. LUNGS: Respirations unlabored. CTAB. Good air exchange. Speaking comfortably in full sentences. ABDOMEN: Soft. Non-distended. Non-tender. No guarding or rebound. No masses. No organomegaly. EXTREMITIES: No peripheral edema. Moves all extremities equally. All extremities neurovascularly intact. No unilateral leg swelling  SKIN: Warm and dry. No acute rashes. NEUROLOGICAL: Alert and oriented. CN's 2-12 intact. No gross facial drooping. Strength 5/5, sensation intact. PSYCHIATRIC: Normal mood and affect. RADIOLOGY  No orders to display       LABS  Labs Reviewed   COMPREHENSIVE METABOLIC PANEL W/ REFLEX TO MG FOR LOW K - Abnormal; Notable for the following components:       Result Value    ALT 59 (*)     AST 50 (*)     All other components within normal limits   CBC WITH AUTO DIFFERENTIAL   TROPONIN   BRAIN NATRIURETIC PEPTIDE   D-DIMER, QUANTITATIVE   HCG, SERUM, QUALITATIVE       PROCEDURES  Unless otherwise noted below, none  Procedures    MDM  MDM  59-year-old female history of COVID-19 x5 days, MAGALI, GERD presents emergency department for evaluation of syncopal episode. Patient reports that was walking to the grocery store when she began to feel lightheaded, overheated and then lost consciousness. She states that standing up seems to worsen her symptoms. She denies any chest pain or shortness of breath prior to the fall. Given the prodrome estimate less likely for cardiogenic syncope. EKG was obtained which demonstrates ventricular rate of 48, , QRS 86, , QTc 412. Given her recent diagnosis of COVID increases with coagulopathy D-dimer was obtained to evaluate for risk of underlying pulmonary embolism. Fortunately this was negative recent likelihood of underlying pulmonary embolism. BNP was negative as well recently could have any structural heart failure. Troponin negative. No electrolyte abnormalities or kidney injuries. Pregnancy negative. Hemoglobin 14.5. We will plan to treat with IV fluids and chest orthostatic vital signs. However pending results orthostatics I do anticipate patient be safely discharged home. Was observed in the emergency department 3 hours with improvement of her symptoms. I did and Danisha in the emergency department and did she feel comfortable and did not feel like she was going to lose consciousness. At this time I do recommend the patient continue to rehydrate at home and can take antiemetics as needed. EKG does demonstrate sinus bradycardia at a rate of 48 bpm, , QRS 86, QT-QTc 462/412. Normal axis. No ST elevation or T wave inversions appreciated. [place] Syncope Risk Score from [email or web address]  on 5/23/2022  ** All calculations should be rechecked by clinician prior to use **    RESULT SUMMARY:  -3 points  [facility] Syncope Risk Score    Very low  risk  0.4% risk of 30-day serious adverse event (death, arrhythmia, MI -- full list in Evidence)      INPUTS:  Predisposition to vasovagal symptoms --> -1 = Yes  Heart disease history --> 0 = No  sBP 180 mmHg --> 0 = No  Elevated troponin --> 0 = No  Abnormal QRS axis --> 0 = No  QRS duration &gt;130 ms --> 0 = No  Corrected QT interval >480 ms --> 0 = No  ED diagnosis --> -2 = Vasovagal syncope        DISPOSITION  Patient was discharged to home in good condition. CLINICAL IMPRESSION  1.  Syncope and collapse            Marlon Loza PA-C  05/23/22 0045

## 2022-05-23 NOTE — ED NOTES
Provided d/c instructions and follow up instructions ambulated out with no difficulty      Elli Maravilla RN  05/23/22 3822

## 2022-05-24 ENCOUNTER — HOSPITAL ENCOUNTER (EMERGENCY)
Age: 37
Discharge: HOME | End: 2022-05-24
Payer: MEDICAID

## 2022-05-24 ENCOUNTER — APPOINTMENT (OUTPATIENT)
Dept: GENERAL RADIOLOGY | Age: 37
End: 2022-05-24
Payer: MEDICAID

## 2022-05-24 ENCOUNTER — APPOINTMENT (OUTPATIENT)
Dept: CT IMAGING | Age: 37
End: 2022-05-24
Payer: MEDICAID

## 2022-05-24 VITALS
RESPIRATION RATE: 21 BRPM | DIASTOLIC BLOOD PRESSURE: 60 MMHG | HEART RATE: 66 BPM | OXYGEN SATURATION: 100 % | SYSTOLIC BLOOD PRESSURE: 96 MMHG | TEMPERATURE: 98.7 F

## 2022-05-24 DIAGNOSIS — E86.0 DEHYDRATION: ICD-10-CM

## 2022-05-24 DIAGNOSIS — R55 NEAR SYNCOPE: Primary | ICD-10-CM

## 2022-05-24 LAB
A/G RATIO: 1.8 (ref 1.1–2.2)
ALBUMIN SERPL-MCNC: 4.7 G/DL (ref 3.4–5)
ALP BLD-CCNC: 101 U/L (ref 40–129)
ALT SERPL-CCNC: 53 U/L (ref 10–40)
ANION GAP SERPL CALCULATED.3IONS-SCNC: 15 MMOL/L (ref 3–16)
AST SERPL-CCNC: 44 U/L (ref 15–37)
BACTERIA: ABNORMAL /HPF
BASOPHILS ABSOLUTE: 0 K/UL (ref 0–0.2)
BASOPHILS RELATIVE PERCENT: 0.1 %
BILIRUB SERPL-MCNC: 0.4 MG/DL (ref 0–1)
BILIRUBIN URINE: ABNORMAL
BLOOD, URINE: NEGATIVE
BUN BLDV-MCNC: 11 MG/DL (ref 7–20)
CALCIUM SERPL-MCNC: 9.2 MG/DL (ref 8.3–10.6)
CHLORIDE BLD-SCNC: 106 MMOL/L (ref 99–110)
CLARITY: ABNORMAL
CO2: 21 MMOL/L (ref 21–32)
COLOR: YELLOW
CREAT SERPL-MCNC: 0.6 MG/DL (ref 0.6–1.1)
EKG ATRIAL RATE: 63 BPM
EKG DIAGNOSIS: NORMAL
EKG P AXIS: 68 DEGREES
EKG P-R INTERVAL: 152 MS
EKG Q-T INTERVAL: 414 MS
EKG QRS DURATION: 84 MS
EKG QTC CALCULATION (BAZETT): 423 MS
EKG R AXIS: 63 DEGREES
EKG T AXIS: 43 DEGREES
EKG VENTRICULAR RATE: 63 BPM
EOSINOPHILS ABSOLUTE: 0 K/UL (ref 0–0.6)
EOSINOPHILS RELATIVE PERCENT: 0 %
EPITHELIAL CELLS, UA: ABNORMAL /HPF (ref 0–5)
GFR AFRICAN AMERICAN: >60
GFR NON-AFRICAN AMERICAN: >60
GLUCOSE BLD-MCNC: 90 MG/DL (ref 70–99)
GLUCOSE URINE: NEGATIVE MG/DL
GONADOTROPIN, CHORIONIC (HCG) QUANT: <5 MIU/ML
HCT VFR BLD CALC: 40 % (ref 36–48)
HEMOGLOBIN: 13.4 G/DL (ref 12–16)
KETONES, URINE: ABNORMAL MG/DL
LEUKOCYTE ESTERASE, URINE: NEGATIVE
LIPASE: 19 U/L (ref 13–60)
LYMPHOCYTES ABSOLUTE: 1.8 K/UL (ref 1–5.1)
LYMPHOCYTES RELATIVE PERCENT: 21.3 %
MCH RBC QN AUTO: 31.6 PG (ref 26–34)
MCHC RBC AUTO-ENTMCNC: 33.6 G/DL (ref 31–36)
MCV RBC AUTO: 94 FL (ref 80–100)
MICROSCOPIC EXAMINATION: YES
MONOCYTES ABSOLUTE: 0.8 K/UL (ref 0–1.3)
MONOCYTES RELATIVE PERCENT: 9.7 %
NEUTROPHILS ABSOLUTE: 5.7 K/UL (ref 1.7–7.7)
NEUTROPHILS RELATIVE PERCENT: 68.9 %
NITRITE, URINE: NEGATIVE
PDW BLD-RTO: 13.6 % (ref 12.4–15.4)
PH UA: 6.5 (ref 5–8)
PLATELET # BLD: 183 K/UL (ref 135–450)
PMV BLD AUTO: 9.4 FL (ref 5–10.5)
POTASSIUM SERPL-SCNC: 4.2 MMOL/L (ref 3.5–5.1)
PROTEIN UA: 100 MG/DL
RBC # BLD: 4.25 M/UL (ref 4–5.2)
RBC UA: ABNORMAL /HPF (ref 0–4)
SODIUM BLD-SCNC: 142 MMOL/L (ref 136–145)
SPECIFIC GRAVITY UA: 1.02 (ref 1–1.03)
TOTAL PROTEIN: 7.3 G/DL (ref 6.4–8.2)
URINE TYPE: ABNORMAL
UROBILINOGEN, URINE: 1 E.U./DL
WBC # BLD: 8.3 K/UL (ref 4–11)
WBC UA: ABNORMAL /HPF (ref 0–5)

## 2022-05-24 PROCEDURE — 71260 CT THORAX DX C+: CPT

## 2022-05-24 PROCEDURE — 83690 ASSAY OF LIPASE: CPT

## 2022-05-24 PROCEDURE — 96374 THER/PROPH/DIAG INJ IV PUSH: CPT

## 2022-05-24 PROCEDURE — 6370000000 HC RX 637 (ALT 250 FOR IP): Performed by: PHYSICIAN ASSISTANT

## 2022-05-24 PROCEDURE — 6360000004 HC RX CONTRAST MEDICATION: Performed by: PHYSICIAN ASSISTANT

## 2022-05-24 PROCEDURE — 85025 COMPLETE CBC W/AUTO DIFF WBC: CPT

## 2022-05-24 PROCEDURE — 2580000003 HC RX 258: Performed by: PHYSICIAN ASSISTANT

## 2022-05-24 PROCEDURE — 96361 HYDRATE IV INFUSION ADD-ON: CPT

## 2022-05-24 PROCEDURE — 81001 URINALYSIS AUTO W/SCOPE: CPT

## 2022-05-24 PROCEDURE — 6360000002 HC RX W HCPCS: Performed by: PHYSICIAN ASSISTANT

## 2022-05-24 PROCEDURE — 93010 ELECTROCARDIOGRAM REPORT: CPT | Performed by: INTERNAL MEDICINE

## 2022-05-24 PROCEDURE — 80053 COMPREHEN METABOLIC PANEL: CPT

## 2022-05-24 PROCEDURE — 93005 ELECTROCARDIOGRAM TRACING: CPT | Performed by: EMERGENCY MEDICINE

## 2022-05-24 PROCEDURE — 99285 EMERGENCY DEPT VISIT HI MDM: CPT

## 2022-05-24 PROCEDURE — 84702 CHORIONIC GONADOTROPIN TEST: CPT

## 2022-05-24 PROCEDURE — 71045 X-RAY EXAM CHEST 1 VIEW: CPT

## 2022-05-24 RX ORDER — ONDANSETRON 2 MG/ML
4 INJECTION INTRAMUSCULAR; INTRAVENOUS ONCE
Status: COMPLETED | OUTPATIENT
Start: 2022-05-24 | End: 2022-05-24

## 2022-05-24 RX ORDER — PROMETHAZINE HYDROCHLORIDE 25 MG/1
25 TABLET ORAL ONCE
Status: COMPLETED | OUTPATIENT
Start: 2022-05-24 | End: 2022-05-24

## 2022-05-24 RX ORDER — 0.9 % SODIUM CHLORIDE 0.9 %
1000 INTRAVENOUS SOLUTION INTRAVENOUS ONCE
Status: COMPLETED | OUTPATIENT
Start: 2022-05-24 | End: 2022-05-24

## 2022-05-24 RX ADMIN — IOPAMIDOL 75 ML: 755 INJECTION, SOLUTION INTRAVENOUS at 17:18

## 2022-05-24 RX ADMIN — PROMETHAZINE HYDROCHLORIDE 25 MG: 25 TABLET ORAL at 18:18

## 2022-05-24 RX ADMIN — SODIUM CHLORIDE 1000 ML: 9 INJECTION, SOLUTION INTRAVENOUS at 13:59

## 2022-05-24 RX ADMIN — ONDANSETRON 4 MG: 2 INJECTION INTRAMUSCULAR; INTRAVENOUS at 13:59

## 2022-05-24 ASSESSMENT — PAIN DESCRIPTION - LOCATION: LOCATION: CHEST

## 2022-05-24 ASSESSMENT — PAIN - FUNCTIONAL ASSESSMENT: PAIN_FUNCTIONAL_ASSESSMENT: 0-10

## 2022-05-24 ASSESSMENT — PAIN SCALES - GENERAL: PAINLEVEL_OUTOF10: 6

## 2022-05-24 ASSESSMENT — PAIN DESCRIPTION - ORIENTATION: ORIENTATION: MID

## 2022-05-24 NOTE — ED NOTES
Nayely Layton at bedside     [facility], Replaced by [facility]  05/24/22 0799

## 2022-05-24 NOTE — ED PROVIDER NOTES
[facility] Emergency Department    CHIEF COMPLAINT  Loss of Consciousness (pt was at work, she walked to work, pt became [place] little winded\" pt became dizzy and saw spots\"  pt denies loc. pt seen here a few days pta for same issue. + chest pain, weak, dizzy, sob.   )      SHARED SERVICE VISIT  Evaluated by BANDAR. My supervising physician was available for consultation. EKG interpretation provided by attending physician. HISTORY OF PRESENT ILLNESS  Marti Blanca is a 39 y.o. female who presents to the ED complaining of 2-day history of lightheadedness with syncopal episode. Patient brought in by squad today for evaluation. Patient states that yesterday had syncopal episode. Reports that today had many of those predisposing symptoms although did not actually passed out. Patient states that she was seen and evaluated here several days ago tested positive for COVID several days prior to that. States that she has had nausea, vomiting and diarrhea. Has had some chest discomfort and shortness of breath with nonproductive cough. No hemoptysis. She is a non-smoker. No pain with deep inspiration. She denies neck or back pain. No neck arm or jaw pain. She denies urinary symptoms. No vaginal complaints. No leg pain or swelling. No recent travel or trauma. No other complaints, modifying factors or associated symptoms. Nursing notes reviewed.    Past Medical History:   Diagnosis Date    Bipolar affect, depressed ([address]     Depression     GERD (gastroesophageal reflux disease)     IBS (irritable bowel syndrome)      Past Surgical History:   Procedure Laterality Date    CHOLECYSTECTOMY      LEEP N/A 6/25/2019    LOOP ELECTROSURGICAL EXCISION PROCEDURE performed by Rupinder Shelton MD at [facility] N/A 8/18/2021    EGD BIOPSY performed by [place] Barbosa MD at [address] [place] [address]     Family History   Problem Relation Age of Onset    Cancer Mother 48        RCC    Cancer Father         bladder     Social History     Socioeconomic History    Marital status: Single     Spouse name: Not on file    Number of children: Not on file    Years of education: Not on file    Highest education level: Not on file   Occupational History    Not on file   Tobacco Use    Smoking status: Current Every Day Smoker     Packs/day: 0.50     Years: 20.00     Pack years: 10.00     Types: Cigarettes    Smokeless tobacco: Never Used   Vaping Use    Vaping Use: Never used   Substance and Sexual Activity    Alcohol use: Not Currently    Drug use: No    Sexual activity: Yes     Birth control/protection: Injection   Other Topics Concern    Not on file   Social History Narrative    Not on file     Social Determinants of Health     Financial Resource Strain:     Difficulty of Paying Living Expenses: Not on file   Food Insecurity:     Worried About Running Out of Food in the Last Year: Not on file    Lynette of Food in the Last Year: Not on file   Transportation Needs:     Lack of Transportation (Medical): Not on file    Lack of Transportation (Non-Medical):  Not on file   Physical Activity:     Days of Exercise per Week: Not on file    Minutes of Exercise per Session: Not on file   Stress:     Feeling of Stress : Not on file   Social Connections:     Frequency of Communication with Friends and Family: Not on file    Frequency of Social Gatherings with Friends and Family: Not on file    Attends Adventism Services: Not on file    Active Member of Clubs or Organizations: Not on file    Attends Club or Organization Meetings: Not on file    Marital Status: Not on file   Intimate Partner Violence:     Fear of Current or Ex-Partner: Not on file    Emotionally Abused: Not on file    Physically Abused: Not on file    Sexually Abused: Not on file   Housing Stability:     Unable to Pay for Housing in the Last Year: Not on file    Number of [place] in the Last Year: Not on file    Unstable Housing in the Last Year: Not on file     Current Facility-Administered Medications   Medication Dose Route Frequency Provider Last Rate Last Admin    0.9 % sodium chloride bolus  1,000 mL IntraVENous Once Vonzell Holstein, PA 1,000 mL/hr at 05/24/22 1359 1,000 mL at 05/24/22 1359     Current Outpatient Medications   Medication Sig Dispense Refill    ondansetron (ZOFRAN ODT) 4 MG disintegrating tablet Take 1 tablet by mouth every 8 hours as needed for Nausea or Vomiting 20 tablet 0    potassium chloride (KLOR-CON M) 20 MEQ extended release tablet Take 1 tablet by mouth daily for 5 days 5 tablet 0    promethazine (PHENERGAN) 12.5 MG tablet TAKE ONE TABLET BY MOUTH FOUR TIMES A DAY AS NEEDED FOR NAUSEA 30 tablet 2    omeprazole (PRILOSEC) 40 MG delayed release capsule TAKE ONE CAPSULE BY MOUTH EVERY MORNING BEFORE BREAKFAST 90 capsule 3    cholestyramine (QUESTRAN) 4 g packet Take 1 packet by mouth 3 times daily Reduce the dose to twice a day if you get constipated. 90 packet 3    famotidine (PEPCID) 20 MG tablet TAKE ONE TABLET BY MOUTH ONCE NIGHTLY AS NEEDED FOR REFLUX 90 tablet 1    acetaminophen (TYLENOL) 500 MG tablet Take 1 tablet by mouth 4 times daily as needed for Pain 360 tablet 1     Allergies   Allergen Reactions    Effexor [Venlafaxine] Anaphylaxis    Morphine Anaphylaxis    Food      Mckayla cherries       REVIEW OF SYSTEMS  10 systems reviewed, pertinent positives per HPI otherwise noted to be negative    PHYSICAL EXAM  /75   Pulse 64   Temp 98.7 °F (37.1 °C) (Oral)   Resp 18   SpO2 100%   GENERAL APPEARANCE: Awake and alert. Cooperative. No acute distress. HEAD: Normocephalic. Atraumatic. EYES: PERRL. EOM's grossly intact. ENT: Mucous membranes are moist.   NECK: Supple. No JVD. No tracheal tenderness or deviation. No crepitus. HEART: RRR. No murmurs. LUNGS: Respirations unlabored. CTAB. Good air exchange.  Speaking comfortably in full sentences. No wheezing, rhonchi, rales. ABDOMEN: Soft. Non-distended. Non-tender. No guarding or rebound. No midline pulsatile mass. EXTREMITIES: No peripheral edema. No unilateral calf pain, redness or swelling. Moves all extremities equally. All extremities neurovascularly intact. SKIN: Warm and dry. No acute rashes. NEUROLOGICAL: Alert and oriented. CN's 2-12 intact. No gross facial drooping. Strength 5/5, sensation intact. PSYCHIATRIC: Normal mood and affect. RADIOLOGY  XR CHEST PORTABLE    Result Date: 5/18/2022  EXAMINATION: ONE XRAY VIEW OF THE CHEST 5/18/2022 2:00 pm COMPARISON: 11/01/2017 HISTORY: ORDERING SYSTEM PROVIDED HISTORY: cough TECHNOLOGIST PROVIDED HISTORY: Reason for exam:->cough Reason for Exam: cough FINDINGS: There is relative under penetration underlying the breast shadows. The cardiac silhouette, mediastinal hilar contours are normal.  No pleural effusion or consolidation is identified. No acute cardiopulmonary disease on portable chest with mild limitation. RECOMMENDATION: If there is persistent concern, follow-up PA and lateral chest is recommended. ED COURSE  Patient received Zofran for nausea, with good relief. Triage vitals stable. Given a 1 L IV fluid bolus. Patient symptomatically orthostatic with increase in heart rate from a lying to sitting position. EKG was a normal sinus rhythm without evidence for acute ischemic change. CBC without leukocytosis or anemia. CMP unremarkable. Normal lipase. hCG was negative. Urinalysis trace ketonuria. Microscopic urinalysis without evidence for infectious process. Stable portable chest x-ray. CT of the chest without evidence to suggest acute pulmonary embolus. Repeat Ortho was improved and patient feeling better symptomatically. Will be discharged at this time with recommendations for increasing fluid intake.   Have discussed return precautions as well as recommendations for follow-up otherwise and patient is in agreement and comfortable discharge. A discussion was had with Ms. Hubbard regarding near syncopal episode, ED findings and recommendations for follow-up. Risk management discussed and shared decision making had with patient and/or surrogate. All questions were answered. Patient will follow up with PCP in 2 to 3 days for further evaluation/treatment. All questions answered. Patient will return to ED for new/worsening symptoms. No medications prescribed at discharge.     MDM  Results for orders placed or performed during the hospital encounter of 05/24/22   CBC with Auto Differential   Result Value Ref Range    WBC 8.3 4.0 - 11.0 K/uL    RBC 4.25 4.00 - 5.20 M/uL    Hemoglobin 13.4 12.0 - 16.0 g/dL    Hematocrit 40.0 36.0 - 48.0 %    MCV 94.0 80.0 - 100.0 fL    MCH 31.6 26.0 - 34.0 pg    MCHC 33.6 31.0 - 36.0 g/dL    RDW 13.6 12.4 - 15.4 %    Platelets 093 824 - 844 K/uL    MPV 9.4 5.0 - 10.5 fL    Neutrophils % 68.9 %    Lymphocytes % 21.3 %    Monocytes % 9.7 %    Eosinophils % 0.0 %    Basophils % 0.1 %    Neutrophils Absolute 5.7 1.7 - 7.7 K/uL    Lymphocytes Absolute 1.8 1.0 - 5.1 K/uL    Monocytes Absolute 0.8 0.0 - 1.3 K/uL    Eosinophils Absolute 0.0 0.0 - 0.6 K/uL    Basophils Absolute 0.0 0.0 - 0.2 K/uL   Comprehensive Metabolic Panel   Result Value Ref Range    Sodium 142 136 - 145 mmol/L    Potassium 4.2 3.5 - 5.1 mmol/L    Chloride 106 99 - 110 mmol/L    CO2 21 21 - 32 mmol/L    Anion Gap 15 3 - 16    Glucose 90 70 - 99 mg/dL    BUN 11 7 - 20 mg/dL    CREATININE 0.6 0.6 - 1.1 mg/dL    GFR Non-African American >60 >60    GFR African American >60 >60    Calcium 9.2 8.3 - 10.6 mg/dL    Total Protein 7.3 6.4 - 8.2 g/dL    Albumin 4.7 3.4 - 5.0 g/dL    Albumin/Globulin Ratio 1.8 1.1 - 2.2    Total Bilirubin 0.4 0.0 - 1.0 mg/dL    Alkaline Phosphatase 101 40 - 129 U/L    ALT 53 (H) 10 - 40 U/L    AST 44 (H) 15 - 37 U/L   Lipase   Result Value Ref Range    Lipase 19.0 13.0 - 60.0 U/L   Urinalysis Result Value Ref Range    Color, UA Yellow Straw/Yellow    Clarity, UA SL CLOUDY (A) Clear    Glucose, Ur Negative Negative mg/dL    Bilirubin Urine SMALL (A) Negative    Ketones, Urine TRACE (A) Negative mg/dL    Specific Gravity, UA 1.020 1.005 - 1.030    Blood, Urine Negative Negative    pH, UA 6.5 5.0 - 8.0    Protein,  (A) Negative mg/dL    Urobilinogen, Urine 1.0 <2.0 E.U./dL    Nitrite, Urine Negative Negative    Leukocyte Esterase, Urine Negative Negative    Microscopic Examination YES     Urine Type NotGiven    hCG, quantitative, pregnancy   Result Value Ref Range    hCG Quant <5.0 <5.0 mIU/mL   Microscopic Urinalysis   Result Value Ref Range    WBC, UA None seen 0 - 5 /HPF    RBC, UA 0-2 0 - 4 /HPF    Epithelial Cells, UA  (A) 0 - 5 /HPF    Bacteria, UA Rare (A) None Seen /HPF   EKG 12 Lead   Result Value Ref Range    Ventricular Rate 63 BPM    Atrial Rate 63 BPM    P-R Interval 152 ms    QRS Duration 84 ms    Q-T Interval 414 ms    QTc Calculation (Bazett) 423 ms    P Axis 68 degrees    R Axis 63 degrees    T Axis 43 degrees    Diagnosis       Normal sinus rhythmNormal ECGConfirmed by GEETA Kasper MD ([id number]) on 5/24/2022 2:03:21 PM     I estimate there is LOW risk for ACUTE CORONARY SYNDROME, INTRACRANIAL HEMORRHAGE, MALIGNANT DYSRHYTHMIA, MENINGITIS, PNEUMONIA, PULMONARY EMBOLISM, SEPSIS, SUBARACHNOID HEMORRHAGE, SUBDURAL HEMATOMA, STROKE, or URINARY TRACT INFECTION, thus I consider the discharge disposition reasonable. Danisha Hubbard and I have discussed the diagnosis and risks, and we agree with discharging home to follow-up with their primary doctor. We also discussed returning to the Emergency Department immediately if new or worsening symptoms occur. We have discussed the symptoms which are most concerning (e.g., changing or worsening pain, weakness, vomiting, fever) that necessitate immediate return. Final Impression  1. Near syncope    2.  Dehydration      Blood pressure 96/60, pulse 66, temperature 98.7 °F (37.1 °C), temperature source Oral, resp. rate 21, SpO2 100 %, not currently breastfeeding. DISPOSITION  Patient was discharged to home in good condition.           Milana Webb, [address]  05/24/22 9647

## 2022-05-24 NOTE — ED NOTES
Pt ambulating to bathroom at this time with steady gait.      Clarke, [address] [facility]  05/24/22 7132

## 2022-05-24 NOTE — Clinical Note
Mere Clements was seen and treated in our emergency department on 5/24/2022. She may return to work on 05/26/2022. If you have any questions or concerns, please don't hesitate to call.       CONNIE Solano

## 2022-06-17 RX ORDER — PROMETHAZINE HYDROCHLORIDE 12.5 MG/1
TABLET ORAL
Qty: 30 TABLET | Refills: 2 | Status: SHIPPED | OUTPATIENT
Start: 2022-06-17 | End: 2022-09-29

## 2022-06-17 NOTE — TELEPHONE ENCOUNTER
Refill Request     CONFIRM preferrred pharmacy with the patient. If Mail Order Rx - Pend for 90 day refill. Last Seen: Last Seen Department: 5/28/2021  Last Seen by PCP: 5/28/2021    Last Written: 3/30/2022    Next Appointment:   No future appointments.         Requested Prescriptions     Pending Prescriptions Disp Refills    promethazine (PHENERGAN) 12.5 MG tablet [Pharmacy Med Name: PROMETHAZINE 12.5 MG TABLET] 30 tablet 2     Sig: TAKE ONE TABLET BY MOUTH FOUR TIMES A DAY AS NEEDED FOR NAUSEA

## 2022-09-28 NOTE — TELEPHONE ENCOUNTER
Refill Request     CONFIRM preferrred pharmacy with the patient. If Mail Order Rx - Pend for 90 day refill. Last Seen: Last Seen Department: 5/28/2021    Last Seen by PCP: 5/28/2021    Last Written: 6/17/22 30 tablet 2 refills    If no future appointment scheduled, route STAFF MESSAGE with patient name to the [facility] for scheduling. Next Appointment: n/a  No future appointments. Message sent to [employer] to schedule appt with patient?   NO      Requested Prescriptions     Pending Prescriptions Disp Refills    promethazine (PHENERGAN) 12.5 MG tablet [Pharmacy Med Name: PROMETHAZINE 12.5 MG TABLET] 30 tablet 2     Sig: TAKE ONE TABLET BY MOUTH FOUR TIMES A DAY AS NEEDED FOR NAUSEA

## 2022-09-29 RX ORDER — PROMETHAZINE HYDROCHLORIDE 12.5 MG/1
TABLET ORAL
Qty: 30 TABLET | Refills: 2 | Status: SHIPPED | OUTPATIENT
Start: 2022-09-29

## 2022-11-20 DIAGNOSIS — K21.9 GASTROESOPHAGEAL REFLUX DISEASE: ICD-10-CM

## 2022-11-20 NOTE — TELEPHONE ENCOUNTER
Refill Request     CONFIRM preferrred pharmacy with the patient. If Mail Order Rx - Pend for 90 day refill. Last Seen: Last Seen Department: 5/28/2021  Last Seen by PCP: 5/28/2021    Last Written: 9/30/2021    If no future appointment scheduled, route STAFF MESSAGE with patient name to the [facility] for scheduling. Next Appointment:   No future appointments. Message sent to [address] [place] [address] to schedule appt with patient?   NO      Requested Prescriptions     Pending Prescriptions Disp Refills    omeprazole (PRILOSEC) 40 MG delayed release capsule [Pharmacy Med Name: OMEPRAZOLE DR 40 MG CAPSULE] 90 capsule 3     Sig: TAKE ONE CAPSULE BY MOUTH EVERY MORNING BEFORE BREAKFAST

## 2022-11-21 RX ORDER — OMEPRAZOLE 40 MG/1
CAPSULE, DELAYED RELEASE ORAL
Qty: 90 CAPSULE | Refills: 3 | Status: SHIPPED | OUTPATIENT
Start: 2022-11-21

## 2023-01-19 RX ORDER — PROMETHAZINE HYDROCHLORIDE 12.5 MG/1
TABLET ORAL
Qty: 30 TABLET | Refills: 2 | Status: SHIPPED | OUTPATIENT
Start: 2023-01-19

## 2023-01-19 NOTE — TELEPHONE ENCOUNTER
.Refill Request     CONFIRM preferrred pharmacy with the patient. If Mail Order Rx - Pend for 90 day refill. Last Seen: Last Seen Department: 5/28/2021  Last Seen by PCP: 5/28/2021    Last Written: 9/29/22 30 with 2     If no future appointment scheduled, route STAFF MESSAGE with patient name to the [facility] for scheduling. Next Appointment:   No future appointments. Message sent to [address] [place] [address] to schedule appt with patient?   YES      Requested Prescriptions     Pending Prescriptions Disp Refills    promethazine (PHENERGAN) 12.5 MG tablet [Pharmacy Med Name: PROMETHAZINE 12.5 MG TABLET] 30 tablet 2     Sig: TAKE ONE TABLET BY MOUTH FOUR TIMES A DAY AS NEEDED FOR NAUSEA

## 2023-04-26 RX ORDER — PROMETHAZINE HYDROCHLORIDE 12.5 MG/1
TABLET ORAL
Qty: 30 TABLET | Refills: 2 | Status: SHIPPED | OUTPATIENT
Start: 2023-04-26

## 2023-04-26 NOTE — TELEPHONE ENCOUNTER
Refill Request     CONFIRM preferred pharmacy with the patient. If Mail Order Rx - Pend for 90 day refill. Last Seen: Last Seen Department: 5/28/2021  Last Seen by PCP: 5/28/2021    Last Written: 1/19/23 30 with 2 refills     If no future appointment scheduled:  Review the last OV with PCP and review information for follow-up visit,  Route STAFF MESSAGE with patient name to the [facility] for scheduling with the following information:            -  Timing of next visit           -  Visit type ie Physical, OV, etc           -  Diagnoses/Reason ie. COPD, HTN - Do not use MEDICATION, Follow-up or CHECK UP - Give reason for visit      Next Appointment:   No future appointments. Message sent to [employer] [facility] to schedule appt with patient?   NO      Requested Prescriptions     Pending Prescriptions Disp Refills    promethazine (PHENERGAN) 12.5 MG tablet [Pharmacy Med Name: PROMETHAZINE 12.5 MG TABLET] 30 tablet 2     Sig: TAKE ONE TABLET BY MOUTH FOUR TIMES A DAY AS NEEDED FOR NAUSEA

## 2023-07-24 RX ORDER — PROMETHAZINE HYDROCHLORIDE 12.5 MG/1
TABLET ORAL
Qty: 30 TABLET | Refills: 2 | Status: SHIPPED | OUTPATIENT
Start: 2023-07-24

## 2024-01-02 ENCOUNTER — HOSPITAL ENCOUNTER (EMERGENCY)
Age: 39
Discharge: HOME | End: 2024-01-02

## 2024-01-02 VITALS
OXYGEN SATURATION: 99 % | HEART RATE: 96 BPM | DIASTOLIC BLOOD PRESSURE: 71 MMHG | TEMPERATURE: 98.5 F | SYSTOLIC BLOOD PRESSURE: 120 MMHG | WEIGHT: 160 LBS | BODY MASS INDEX: 27.31 KG/M2 | RESPIRATION RATE: 18 BRPM | HEIGHT: 64 IN

## 2024-01-02 DIAGNOSIS — J06.9 ACUTE UPPER RESPIRATORY INFECTION: Primary | ICD-10-CM

## 2024-01-02 LAB
FLUAV RNA RESP QL NAA+PROBE: NOT DETECTED
FLUBV RNA RESP QL NAA+PROBE: NOT DETECTED
SARS-COV-2 RNA RESP QL NAA+PROBE: NOT DETECTED

## 2024-01-02 PROCEDURE — 6360000002 HC RX W HCPCS: Performed by: PHYSICIAN ASSISTANT

## 2024-01-02 PROCEDURE — 99283 EMERGENCY DEPT VISIT LOW MDM: CPT

## 2024-01-02 PROCEDURE — 87636 SARSCOV2 & INF A&B AMP PRB: CPT

## 2024-01-02 RX ORDER — DEXAMETHASONE 4 MG/1
10 TABLET ORAL ONCE
Status: COMPLETED | OUTPATIENT
Start: 2024-01-02 | End: 2024-01-02

## 2024-01-02 RX ADMIN — DEXAMETHASONE 10 MG: 4 TABLET ORAL at 13:53

## 2024-01-02 ASSESSMENT — PAIN - FUNCTIONAL ASSESSMENT: PAIN_FUNCTIONAL_ASSESSMENT: NONE - DENIES PAIN

## 2024-01-02 NOTE — ED PROVIDER NOTES
No pharyngeal swelling, oropharyngeal exudate or uvula swelling.   Eyes:      General:         Right eye: No discharge.         Left eye: No discharge.      Extraocular Movements: Extraocular movements intact.      Pupils: Pupils are equal, round, and reactive to light.   Cardiovascular:      Rate and Rhythm: Normal rate and regular rhythm.      Pulses: Normal pulses.      Heart sounds: Normal heart sounds. No murmur heard.     No friction rub. No gallop.   Pulmonary:      Effort: Pulmonary effort is normal. No respiratory distress.      Breath sounds: Normal breath sounds. No stridor. No wheezing, rhonchi or rales.   Abdominal:      General: Abdomen is flat.      Palpations: Abdomen is soft.      Tenderness: There is no abdominal tenderness. There is no guarding or rebound.   Musculoskeletal:         General: Normal range of motion.      Cervical back: Normal range of motion and neck supple.   Skin:     General: Skin is warm and dry.      Coloration: Skin is not pale.   Neurological:      Mental Status: She is alert and oriented to person, place, and time.   Psychiatric:         Mood and Affect: Mood normal.         Behavior: Behavior normal.             DIAGNOSTIC RESULTS   LABS:    Labs Reviewed   COVID-19 & INFLUENZA COMBO       When ordered only abnormal lab results are displayed. All other labs were within normal range or not returned as of this dictation.    EKG: When ordered, EKG's are interpreted by the Emergency Department Physician in the absence of a cardiologist.  Please see their note for interpretation of EKG.    RADIOLOGY:   Non-plain film images such as CT, Ultrasound and MRI are read by the radiologist. Plain radiographic images are visualized and preliminarily interpreted by the ED Provider with the below findings:        Interpretation per the Radiologist below, if available at the time of this note:    No orders to display     No results found.    No results found.    PROCEDURES   Unless

## 2024-04-15 ENCOUNTER — HOSPITAL ENCOUNTER (EMERGENCY)
Age: 39
Discharge: HOME | End: 2024-04-15

## 2024-04-15 ENCOUNTER — APPOINTMENT (OUTPATIENT)
Dept: GENERAL RADIOLOGY | Age: 39
End: 2024-04-15

## 2024-04-15 VITALS
RESPIRATION RATE: 18 BRPM | TEMPERATURE: 98.4 F | WEIGHT: 160 LBS | HEART RATE: 56 BPM | DIASTOLIC BLOOD PRESSURE: 81 MMHG | SYSTOLIC BLOOD PRESSURE: 125 MMHG | HEIGHT: 64 IN | OXYGEN SATURATION: 99 % | BODY MASS INDEX: 27.31 KG/M2

## 2024-04-15 DIAGNOSIS — S93.402A SPRAIN OF LEFT ANKLE, UNSPECIFIED LIGAMENT, INITIAL ENCOUNTER: Primary | ICD-10-CM

## 2024-04-15 PROCEDURE — 73610 X-RAY EXAM OF ANKLE: CPT

## 2024-04-15 PROCEDURE — 99283 EMERGENCY DEPT VISIT LOW MDM: CPT

## 2024-04-15 ASSESSMENT — PAIN DESCRIPTION - LOCATION: LOCATION: ANKLE

## 2024-04-15 ASSESSMENT — PAIN SCALES - GENERAL: PAINLEVEL_OUTOF10: 6

## 2024-04-15 ASSESSMENT — PAIN - FUNCTIONAL ASSESSMENT: PAIN_FUNCTIONAL_ASSESSMENT: 0-10

## 2024-04-15 ASSESSMENT — PAIN DESCRIPTION - ORIENTATION: ORIENTATION: LEFT

## 2024-04-15 ASSESSMENT — PAIN DESCRIPTION - DESCRIPTORS: DESCRIPTORS: ACHING

## 2024-04-15 NOTE — ED PROVIDER NOTES
[facility]  ED  Emergency Department Encounter    Patient Name: Danisha Hubbard  MRN: [id number]  YOB: 1985  Date of Evaluation: 4/15/2024  Provider: No primary care provider on file.  Note Started: 1:55 PM EDT 4/15/24    CHIEF COMPLAINT  Ankle Pain (Patient to the ED for left ankle pain from twisting it at work. )    SHARED SERVICE VISIT  Evaluated by BANDAR.  My supervising physician was available for consultation.     HISTORY OF PRESENT ILLNESS  Danisha Hubbard is a 38 y.o. female who presents to the ED for evaluation of ankle injury sustained prior to arrival.  Patient drove herself in for evaluation.  Patient states that she rolled the ankle while walking today.  Has had pain laterally.  Worse with movement.  Does not radiate.  No numbness, tingling, weakness.  No additional injury sustained.  States that she has rolled the ankle in the past..    No other complaints, modifying factors or associated symptoms.     Nursing notes reviewed were all reviewed and agreed with or any disagreements were addressed in the HPI.    PMH:  Past Medical History:   Diagnosis Date    Bipolar affect, depressed (HCC)     Depression     GERD (gastroesophageal reflux disease)     IBS (irritable bowel syndrome)      Surgical History:  Past Surgical History:   Procedure Laterality Date    CHOLECYSTECTOMY      LEEP N/A 6/25/2019    LOOP ELECTROSURGICAL EXCISION PROCEDURE performed by Lizabeth Arias MD at [facility] OR    UPPER GASTROINTESTINAL ENDOSCOPY N/A 8/18/2021    EGD BIOPSY performed by Gabriella Duke MD at [facility] ENDOSCOPY     Family History:  Family History   Problem Relation Age of Onset    Cancer Mother 50        RCC    Cancer Father         bladder     Social History:  Social History     Socioeconomic History    Marital status: Single     Spouse name: Not on file    Number of children: Not on file    Years of education: Not on file    Highest education level: Not on file   Occupational

## 2024-04-16 ENCOUNTER — TELEPHONE (OUTPATIENT)
Dept: ORTHOPEDIC SURGERY | Age: 39
End: 2024-04-16

## 2024-04-16 NOTE — TELEPHONE ENCOUNTER
Left voicemail for patient to schedule/reschdule appointment with Dr. Cervantes for  ankle - left . I advised them to call [phone] to make the next appointment available at their leisure.

## 2024-04-25 ENCOUNTER — HOSPITAL ENCOUNTER (EMERGENCY)
Age: 39
Discharge: HOME | End: 2024-04-25

## 2024-04-25 VITALS
BODY MASS INDEX: 27.31 KG/M2 | DIASTOLIC BLOOD PRESSURE: 98 MMHG | SYSTOLIC BLOOD PRESSURE: 149 MMHG | HEART RATE: 88 BPM | OXYGEN SATURATION: 100 % | RESPIRATION RATE: 18 BRPM | HEIGHT: 64 IN | TEMPERATURE: 98.3 F | WEIGHT: 160 LBS

## 2024-04-25 DIAGNOSIS — K08.89 PAIN, DENTAL: Primary | ICD-10-CM

## 2024-04-25 PROCEDURE — 6370000000 HC RX 637 (ALT 250 FOR IP): Performed by: NURSE PRACTITIONER

## 2024-04-25 PROCEDURE — 99283 EMERGENCY DEPT VISIT LOW MDM: CPT

## 2024-04-25 RX ORDER — PREDNISONE 20 MG/1
60 TABLET ORAL ONCE
Status: COMPLETED | OUTPATIENT
Start: 2024-04-25 | End: 2024-04-25

## 2024-04-25 RX ORDER — PENICILLIN V POTASSIUM 250 MG/1
500 TABLET ORAL ONCE
Status: COMPLETED | OUTPATIENT
Start: 2024-04-25 | End: 2024-04-25

## 2024-04-25 RX ORDER — PREDNISONE 10 MG/1
40 TABLET ORAL DAILY
Qty: 16 TABLET | Refills: 0 | Status: SHIPPED | OUTPATIENT
Start: 2024-04-25 | End: 2024-04-29

## 2024-04-25 RX ORDER — PENICILLIN V POTASSIUM 500 MG/1
500 TABLET ORAL 4 TIMES DAILY
Qty: 40 TABLET | Refills: 0 | Status: SHIPPED | OUTPATIENT
Start: 2024-04-25 | End: 2024-05-05

## 2024-04-25 RX ADMIN — PENICILLIN V POTASSIUM 500 MG: 250 TABLET, FILM COATED ORAL at 21:15

## 2024-04-25 RX ADMIN — PREDNISONE 60 MG: 20 TABLET ORAL at 21:15

## 2024-04-25 ASSESSMENT — PAIN DESCRIPTION - LOCATION: LOCATION: JAW

## 2024-04-25 ASSESSMENT — PAIN DESCRIPTION - DESCRIPTORS: DESCRIPTORS: SHOOTING;THROBBING;BURNING

## 2024-04-25 ASSESSMENT — PAIN SCALES - GENERAL: PAINLEVEL_OUTOF10: 9

## 2024-04-25 ASSESSMENT — LIFESTYLE VARIABLES
HOW MANY STANDARD DRINKS CONTAINING ALCOHOL DO YOU HAVE ON A TYPICAL DAY: PATIENT DOES NOT DRINK
HOW OFTEN DO YOU HAVE A DRINK CONTAINING ALCOHOL: NEVER

## 2024-04-25 ASSESSMENT — PAIN DESCRIPTION - FREQUENCY: FREQUENCY: CONTINUOUS

## 2024-04-25 ASSESSMENT — PAIN - FUNCTIONAL ASSESSMENT: PAIN_FUNCTIONAL_ASSESSMENT: 0-10

## 2024-04-25 ASSESSMENT — PAIN DESCRIPTION - ORIENTATION: ORIENTATION: LEFT

## 2024-04-26 NOTE — ED PROVIDER NOTES
[facility]  ED  EMERGENCY DEPARTMENT ENCOUNTER        Pt Name: Danisha Hubbard  MRN: [id number]  Birthdate 1985  Date of evaluation: 4/25/2024  Provider: KATTY Jimenes CNP  PCP: No primary care provider on file.  Note Started: 9:09 PM EDT 4/25/24    Evaluated by BANDAR. I have evaluated this patient.  My supervising physician was available for consultation.      CHIEF COMPLAINT       Chief Complaint   Patient presents with    Dental Pain     Patient reports dental pain involving the gums and jaw on the left side. Patient reports it is hard to swallow. Pt reports not being able to get into the Dentist for 2 weeks.        HISTORY OF PRESENT ILLNESS: 1 or more Elements     History From: Patient  Limitations to history : None    Danisha Hubbard is a 38 y.o. female who presents to ER with dental pain. Reports severe jaw pain from where 2 of her wisdom teeth need to be removed. Left upper and lower jaw. Reports it is an intense, stabbing and burning pain. States she is taking ibuprofen, tylenol, tylenol 3. States none of them are helping. Can not be seen by dentist for a couple of weeks.      Nursing Notes were all reviewed and agreed with or any disagreements were addressed in the HPI.    REVIEW OF SYSTEMS :      Review of Systems    Positives and Pertinent negatives as per HPI.     MEDICAL HISTORY     Past Medical History:   Diagnosis Date    Bipolar affect, depressed (HCC)     Depression     GERD (gastroesophageal reflux disease)     IBS (irritable bowel syndrome)        SURGICAL HISTORY     Past Surgical History:   Procedure Laterality Date    CHOLECYSTECTOMY      LEEP N/A 6/25/2019    LOOP ELECTROSURGICAL EXCISION PROCEDURE performed by Lizabeth Arias MD at [facility] OR    UPPER GASTROINTESTINAL ENDOSCOPY N/A 8/18/2021    EGD BIOPSY performed by Gabriella Duke MD at [facility] ENDOSCOPY       CURRENTMEDICATIONS       Discharge Medication List as of 4/25/2024  9:12 PM        CONTINUE

## 2025-07-23 ENCOUNTER — HOSPITAL ENCOUNTER (EMERGENCY)
Age: 40
Discharge: HOME | End: 2025-07-23
Attending: EMERGENCY MEDICINE

## 2025-07-23 VITALS
RESPIRATION RATE: 16 BRPM | BODY MASS INDEX: 29.52 KG/M2 | OXYGEN SATURATION: 98 % | HEART RATE: 61 BPM | TEMPERATURE: 97.9 F | DIASTOLIC BLOOD PRESSURE: 65 MMHG | WEIGHT: 172 LBS | SYSTOLIC BLOOD PRESSURE: 124 MMHG

## 2025-07-23 DIAGNOSIS — L02.91 ABSCESS: Primary | ICD-10-CM

## 2025-07-23 PROCEDURE — 87205 SMEAR GRAM STAIN: CPT

## 2025-07-23 PROCEDURE — 87070 CULTURE OTHR SPECIMN AEROBIC: CPT

## 2025-07-23 PROCEDURE — 99283 EMERGENCY DEPT VISIT LOW MDM: CPT

## 2025-07-23 PROCEDURE — 10060 I&D ABSCESS SIMPLE/SINGLE: CPT

## 2025-07-23 PROCEDURE — 87075 CULTR BACTERIA EXCEPT BLOOD: CPT

## 2025-07-23 RX ORDER — DOXYCYCLINE 100 MG/1
100 TABLET ORAL 2 TIMES DAILY
Qty: 20 TABLET | Refills: 0 | Status: SHIPPED | OUTPATIENT
Start: 2025-07-23 | End: 2025-08-02

## 2025-07-23 ASSESSMENT — PAIN - FUNCTIONAL ASSESSMENT: PAIN_FUNCTIONAL_ASSESSMENT: 0-10

## 2025-07-23 ASSESSMENT — PAIN SCALES - GENERAL: PAINLEVEL_OUTOF10: 7

## 2025-07-23 NOTE — ED PROVIDER NOTES
I have personally performed a face to face diagnostic evaluation on this patient along with the resident. I have fully participated in the care of this patient I personally saw the patient and performed a substantive portion of the visit including all aspects of the medical decision making.  I have reviewed and agree with all pertinent clinical information including history, physical exam, diagnostic tests, and the plan.      HPI: Danisha Hubbard presented with abscess or cyst on the right shoulder states gotten worse was initially a small pustule.  Patient went swimming and states it got much bigger red.  No previous history of abscesses.  No history of diabetes.  Patient does smoke.  No fevers or chills no other associated symptoms  Chief Complaint   Patient presents with    Abscess     Pt has abscess or cyst on right shoulder. States she's had it for about a week after she went swimming and it's getting bigger.       Review of Systems: See resident note  Vital Signs: /65   Pulse 61   Temp 97.9 °F (36.6 °C) (Oral)   Resp 16   Wt 78 kg (172 lb)   SpO2 98%   BMI 29.52 kg/m²     Alert 39 y.o. female who does not appear toxic or acutely ill  HENT: Atraumatic  Neck: Grossly normal ROM  Chest/Lungs: respiratory effort normal   Musculoskeletal: Grossly normal ROM  Skin: No palor or diaphoresis, fluctuant abscess on the right shoulder at the trapezius muscle no surrounding erythema    Medical Decision Making and Plan:  Pertinent Labs & Imaging studies reviewed. (See resident chart for details)  I agree with resident's assessment and plan.  39-year-old female presents for abscess or cyst on the right shoulder.  Patient is afebrile nontachycardic saturating well on room air normotensive no known history of MRSA recurrent abscesses no history of diabetes.  No neurologic vascular or tendon involvement.  Nothing else seems to make symptoms better or worse.  It is fluctuant.  Wound was drained with incision and

## 2025-07-23 NOTE — ED NOTES
Gauze and tape dressing applied to I&D site at the lower right neck at the resident MD's request.

## 2025-07-23 NOTE — ED PROVIDER NOTES
EMERGENCY DEPARTMENT RESIDENT ENCOUNTER     [facility] EMERGENCY DEPARTMENT     Pt Name: Danisha Hubbard   MRN: [id number]   Birthdate 1985   Date of evaluation: 7/23/2025   Provider: Zeina Jordan MD   PCP: No primary care provider on file.   Note Started: 1:04 PM EDT 7/23/25     CHIEF COMPLAINT     Chief Complaint   Patient presents with    Abscess     Pt has abscess or cyst on right shoulder. States she's had it for about a week after she went swimming and it's getting bigger.         HISTORY OF PRESENT ILLNESS:  History from : Patient   Limitations to history : None     Danisha Hubbard is a 39 y.o. female who presents with 1 week of abscess to her right neck/shoulder area she reports that it started as a \"blackhead\" and that she tried to pop it but that she was not able to express much.  She states that the next day she went swimming and since it has continued to grow, become more painful.  She denies any fever, chills.  She denies history of MRSA.    Nursing Notes were all reviewed and agreed with or any disagreements were addressed in the HPI.     ROS: Positives and Pertinent negatives as per HPI.    PAST MEDICAL HISTORY     PHYSICAL EXAM:  ED Triage Vitals   BP Systolic BP Percentile Diastolic BP Percentile Temp Temp Source Pulse Respirations SpO2   07/23/25 1242 -- -- 07/23/25 1240 07/23/25 1240 07/23/25 1240 07/23/25 1240 07/23/25 1240   124/65   97.9 °F (36.6 °C) Oral 61 16 98 %      Height Weight - Scale         -- 07/23/25 1240          78 kg (172 lb)              Physical Exam  Vitals and nursing note reviewed.   Constitutional:       General: She is not in acute distress.     Appearance: She is not ill-appearing.   HENT:      Head: Normocephalic and atraumatic.   Cardiovascular:      Rate and Rhythm: Normal rate and regular rhythm.      Pulses: Normal pulses.   Pulmonary:      Effort: Pulmonary effort is normal.   Musculoskeletal:      Cervical back: Normal range of motion. No

## 2025-07-23 NOTE — DISCHARGE INSTRUCTIONS
As discussed please keep the wound clean and dry.  It will continue to drain over the next few days.  Change your dressing as needed.  Please call to schedule a follow-up with family medicine for a recheck of your wound in 1 week.  Please also take your antibiotics as prescribed until they are complete.  If you develop a fever, extreme pain, excessive drainage from your wound please seek care immediately.

## 2025-07-26 LAB
BACTERIA SPEC AEROBE CULT: NORMAL
BACTERIA SPEC ANAEROBE CULT: NORMAL
GRAM STN SPEC: NORMAL

## 2025-07-28 LAB
BACTERIA SPEC AEROBE CULT: NORMAL
BACTERIA SPEC ANAEROBE CULT: NORMAL
GRAM STN SPEC: NORMAL

## (undated) DEVICE — KENDALL SCD EXPRESS SLEEVES, KNEE LENGTH, MEDIUM: Brand: KENDALL SCD

## (undated) DEVICE — TUBING SMK EVAC L10FT OD7/8IN L BOR W/ N COND COAT

## (undated) DEVICE — ELECTRODE ELETROSURGICAL AD PED L5.12IN DIA20MM LOOP TIP

## (undated) DEVICE — GLOVE SURG SZ 65 L12IN FNGR THK94MIL STD WHT LTX FREE

## (undated) DEVICE — ELECTRODE,ECG,STRESS,FOAM,3PK: Brand: MEDLINE

## (undated) DEVICE — SET GRAV VENT NVENT CK VLV 3 NDL FREE PRT 10 GTT

## (undated) DEVICE — ELECTRODE BALL DIA5MM TUNGSTEN LLETZ DURABLE RESIST

## (undated) DEVICE — TRAY PROC INTRACERVICAL LEEP DISPOSABLE REDIKIT

## (undated) DEVICE — SET ADMIN PRIMING 7ML L30IN 7.35LB 20 GTT 2ND RLER CLMP

## (undated) DEVICE — SWAB FBR L8IN RAYON TIP PLAS SHFT OBSTETRIC GYNECOLOGIC

## (undated) DEVICE — ELECTRODE PT RET AD L9FT HI MOIST COND ADH HYDRGEL CORDED

## (undated) DEVICE — CANNULA NSL AD TBNG L7FT PVC STR NONFLARED PRNG O2 DEL W STD

## (undated) DEVICE — TOWEL,OR,DSP,ST,BLUE,STD,8/PK,10PK/CS: Brand: MEDLINE

## (undated) DEVICE — SUTURE ETHLN SZ 4-0 L18IN NONABSORBABLE BLK L13MM P-3 3/8 699G

## (undated) DEVICE — CATHETER IV 20GA L1.25IN PNK FEP SFTY STR HUB RADPQ DISP

## (undated) DEVICE — TRAY PREP DRY W/ PREM GLV 2 APPL 6 SPNG 2 UNDPD 1 OVERWRAP

## (undated) DEVICE — CONMED SCOPE SAVER BITE BLOCK, 20X27 MM: Brand: SCOPE SAVER

## (undated) DEVICE — FORCEPS BX L240CM JAW DIA2.8MM L CAP W/ NDL MIC MESH TOOTH

## (undated) DEVICE — SOLUTION IV 1000ML LAC RINGERS PH 6.5 INJ USP VIAFLX PLAS

## (undated) DEVICE — UNDERPAD HOSP W30XL36IN GRN POLYPR HI ABSRB DISP

## (undated) DEVICE — SKIN MARKER,REGULAR TIP WITH RULER AND LABELS: Brand: DEVON

## (undated) DEVICE — X-RAY DETECTABLE SPONGES,16 PLY: Brand: VISTEC

## (undated) DEVICE — ELECTRODE ES M W1CM D1CM SHFT L12CM CONN L3/32IN TUNGSTEN

## (undated) DEVICE — PENCIL ES L3M BTTN SWCH S STL HEX LOK BLDE ELECTRD HOLSTER

## (undated) DEVICE — 3M™ TEGADERM™ TRANSPARENT FILM DRESSING FRAME STYLE, 1624W, 2-3/8 IN X 2-3/4 IN (6 CM X 7 CM), 100/CT 4CT/CASE: Brand: 3M™ TEGADERM™

## (undated) DEVICE — ENDOSCOPIC KIT 2 12 FT OP4 DE2 GWN SYR

## (undated) DEVICE — SUTURE VCRL SZ 2-0 L27IN ABSRB VLT L26MM UR-6 5/8 CIR J602H

## (undated) DEVICE — TELFA NON-ADHERENT ABSORBENT DRESSING: Brand: TELFA

## (undated) DEVICE — TUBING ELECSURG SPECLM W/ ADPT DISP

## (undated) DEVICE — GOWN,SIRUS,NON REINFRCD,LARGE,SET IN SL: Brand: MEDLINE

## (undated) DEVICE — PVC URETHRAL CATHETER: Brand: DOVER

## (undated) DEVICE — LIGHT HANDLE: Brand: DEVON